# Patient Record
Sex: FEMALE | Race: BLACK OR AFRICAN AMERICAN | NOT HISPANIC OR LATINO | Employment: PART TIME | URBAN - METROPOLITAN AREA
[De-identification: names, ages, dates, MRNs, and addresses within clinical notes are randomized per-mention and may not be internally consistent; named-entity substitution may affect disease eponyms.]

---

## 2019-07-17 ENCOUNTER — HISTORICAL (OUTPATIENT)
Dept: LAB | Facility: HOSPITAL | Age: 64
End: 2019-07-17

## 2019-07-17 LAB
ABS NEUT (OLG): 3.97 X10(3)/MCL (ref 2.1–9.2)
ALBUMIN SERPL-MCNC: 3.7 GM/DL (ref 3.4–5)
ALBUMIN/GLOB SERPL: 0.9 RATIO (ref 1–2)
ALP SERPL-CCNC: 66 UNIT/L (ref 45–117)
ALT SERPL-CCNC: 18 UNIT/L (ref 13–56)
APPEARANCE, UA: CLEAR
AST SERPL-CCNC: 21 UNIT/L (ref 15–37)
BACTERIA SPEC CULT: NORMAL
BASOPHILS # BLD AUTO: 0.03 X10(3)/MCL (ref 0–0.2)
BASOPHILS NFR BLD AUTO: 0.4 % (ref 0–1)
BILIRUB SERPL-MCNC: 0.4 MG/DL (ref 0.2–1)
BILIRUB UR QL STRIP: NEGATIVE
BILIRUBIN DIRECT+TOT PNL SERPL-MCNC: <0.1 MG/DL (ref 0–0.2)
BILIRUBIN DIRECT+TOT PNL SERPL-MCNC: >0.3 MG/DL (ref 0–1)
BUN SERPL-MCNC: 12 MG/DL (ref 7–18)
CALCIUM SERPL-MCNC: 9.2 MG/DL (ref 8.5–10.1)
CHLORIDE SERPL-SCNC: 107 MMOL/L (ref 98–107)
CHOLEST SERPL-MCNC: 249 MG/DL (ref 0–199)
CHOLEST/HDLC SERPL: 3 MG/DL (ref 0–8)
CO2 SERPL-SCNC: 26 MMOL/L (ref 21–32)
COLOR UR: YELLOW
CREAT SERPL-MCNC: 0.68 MG/DL (ref 0.55–1.02)
EOSINOPHIL # BLD AUTO: 0.06 X10(3)/MCL (ref 0–0.9)
EOSINOPHIL NFR BLD AUTO: 0.9 % (ref 0–6.4)
ERYTHROCYTE [DISTWIDTH] IN BLOOD BY AUTOMATED COUNT: 13.4 % (ref 11.5–17)
EST. AVERAGE GLUCOSE BLD GHB EST-MCNC: 163 MG/DL
GLOBULIN SER-MCNC: 4 GM/DL (ref 2–4)
GLUCOSE (UA): NEGATIVE
GLUCOSE SERPL-MCNC: 101 MG/DL (ref 74–106)
HBA1C MFR BLD: 7.3 % (ref 4.2–6.3)
HCT VFR BLD AUTO: 39.6 % (ref 37–47)
HDLC SERPL-MCNC: 72 MG/DL
HGB BLD-MCNC: 13.5 GM/DL (ref 12–16)
HGB UR QL STRIP: NEGATIVE
IMM GRANULOCYTES # BLD AUTO: 0.02 10*3/UL (ref 0–0.02)
IMM GRANULOCYTES NFR BLD AUTO: 0.3 % (ref 0–0.43)
KETONES UR QL STRIP: ABNORMAL
LDLC SERPL CALC-MCNC: 156 MG/DL (ref 0–129)
LEUKOCYTE ESTERASE UR QL STRIP: ABNORMAL
LYMPHOCYTES # BLD AUTO: 2.37 X10(3)/MCL (ref 0.6–4.6)
LYMPHOCYTES NFR BLD AUTO: 33.6 % (ref 16–44)
MCH RBC QN AUTO: 32.4 PG (ref 27–31)
MCHC RBC AUTO-ENTMCNC: 34.1 GM/DL (ref 33–36)
MCV RBC AUTO: 95 FL (ref 80–94)
MONOCYTES # BLD AUTO: 0.6 X10(3)/MCL (ref 0.1–1.3)
MONOCYTES NFR BLD AUTO: 8.5 % (ref 4–12.1)
NEUTROPHILS # BLD AUTO: 3.97 X10(3)/MCL (ref 2.1–9.2)
NEUTROPHILS NFR BLD AUTO: 56.3 % (ref 43–73)
NITRITE UR QL STRIP: NEGATIVE
NRBC BLD AUTO-RTO: 0 % (ref 0–0.2)
PH UR STRIP: 6 [PH] (ref 5–7)
PLATELET # BLD AUTO: 240 X10(3)/MCL (ref 130–400)
PMV BLD AUTO: 11.5 FL (ref 7.4–10.4)
POTASSIUM SERPL-SCNC: 3.7 MMOL/L (ref 3.5–5.1)
PROT SERPL-MCNC: 7.7 GM/DL (ref 6.4–8.2)
PROT UR QL STRIP: NEGATIVE
RBC # BLD AUTO: 4.17 X10(6)/MCL (ref 4.2–5.4)
RBC #/AREA URNS HPF: 0 /[HPF]
SODIUM SERPL-SCNC: 140 MMOL/L (ref 136–145)
SP GR UR STRIP: <=1.005 (ref 1–1.03)
SQUAMOUS EPITHELIAL, UA: NORMAL /LPF
TRIGL SERPL-MCNC: 107 MG/DL (ref 0–149)
TSH SERPL-ACNC: 3.03 MIU/ML (ref 0.36–3.74)
UROBILINOGEN UR STRIP-ACNC: NEGATIVE
VLDLC SERPL CALC-MCNC: 21 MG/DL
WBC # SPEC AUTO: 7 X10(3)/MCL (ref 4.5–11.5)
WBC #/AREA URNS HPF: NORMAL /HPF

## 2019-07-19 LAB — FINAL CULTURE: NORMAL

## 2020-10-08 ENCOUNTER — HISTORICAL (OUTPATIENT)
Dept: LAB | Facility: HOSPITAL | Age: 65
End: 2020-10-08

## 2020-10-08 LAB
ABS NEUT (OLG): 4.24 X10(3)/MCL (ref 2.1–9.2)
ALBUMIN SERPL-MCNC: 3.7 GM/DL (ref 3.4–4.8)
ALBUMIN/GLOB SERPL: 0.9 RATIO (ref 1.1–2)
ALP SERPL-CCNC: 72 UNIT/L (ref 40–150)
ALT SERPL-CCNC: 12 UNIT/L (ref 0–55)
APPEARANCE, UA: CLEAR
AST SERPL-CCNC: 22 UNIT/L (ref 5–34)
BACTERIA SPEC CULT: NORMAL
BASOPHILS # BLD AUTO: 0.03 X10(3)/MCL (ref 0–0.2)
BASOPHILS NFR BLD AUTO: 0.4 % (ref 0–1)
BILIRUB SERPL-MCNC: 0.4 MG/DL (ref 0.2–1.2)
BILIRUB UR QL STRIP: NEGATIVE
BILIRUBIN DIRECT+TOT PNL SERPL-MCNC: 0.1 MG/DL (ref 0–0.5)
BILIRUBIN DIRECT+TOT PNL SERPL-MCNC: 0.3 MG/DL (ref 0–0.8)
BUN SERPL-MCNC: 10.2 MG/DL (ref 9.8–20.1)
CALCIUM SERPL-MCNC: 9.4 MG/DL (ref 8.4–10.2)
CHLORIDE SERPL-SCNC: 104 MMOL/L (ref 98–107)
CHOLEST SERPL-MCNC: 406 MG/DL
CHOLEST/HDLC SERPL: 5 {RATIO} (ref 0–5)
CO2 SERPL-SCNC: 26 MMOL/L (ref 23–31)
COLOR UR: ABNORMAL
CREAT SERPL-MCNC: 0.7 MG/DL (ref 0.57–1.11)
CREAT UR-MCNC: 21.2 MG/DL (ref 45–106)
EOSINOPHIL # BLD AUTO: 0.05 X10(3)/MCL (ref 0–0.9)
EOSINOPHIL NFR BLD AUTO: 0.7 % (ref 0–6.4)
ERYTHROCYTE [DISTWIDTH] IN BLOOD BY AUTOMATED COUNT: 14.2 % (ref 11.5–17)
EST. AVERAGE GLUCOSE BLD GHB EST-MCNC: 159.9 MG/DL
GLOBULIN SER-MCNC: 3.9 GM/DL (ref 2.4–3.5)
GLUCOSE (UA): NEGATIVE
GLUCOSE SERPL-MCNC: 133 MG/DL (ref 82–115)
HBA1C MFR BLD: 7.2 %
HCT VFR BLD AUTO: 38.9 % (ref 37–47)
HDLC SERPL-MCNC: 74 MG/DL (ref 40–60)
HGB BLD-MCNC: 12.9 GM/DL (ref 12–16)
HGB UR QL STRIP: NEGATIVE
IMM GRANULOCYTES # BLD AUTO: 0.02 10*3/UL (ref 0–0.02)
IMM GRANULOCYTES NFR BLD AUTO: 0.3 % (ref 0–0.43)
KETONES UR QL STRIP: NEGATIVE
LDLC SERPL CALC-MCNC: 295 MG/DL (ref 50–140)
LEUKOCYTE ESTERASE UR QL STRIP: ABNORMAL
LYMPHOCYTES # BLD AUTO: 2.24 X10(3)/MCL (ref 0.6–4.6)
LYMPHOCYTES NFR BLD AUTO: 31.5 % (ref 16–44)
MCH RBC QN AUTO: 31.6 PG (ref 27–31)
MCHC RBC AUTO-ENTMCNC: 33.2 GM/DL (ref 33–36)
MCV RBC AUTO: 95.3 FL (ref 80–94)
MICROALBUMIN UR-MCNC: <5 UG/ML
MICROALBUMIN/CREAT RATIO PNL UR: <23.6 MG/GM CR (ref 0–30)
MONOCYTES # BLD AUTO: 0.52 X10(3)/MCL (ref 0.1–1.3)
MONOCYTES NFR BLD AUTO: 7.3 % (ref 4–12.1)
NEUTROPHILS # BLD AUTO: 4.24 X10(3)/MCL (ref 2.1–9.2)
NEUTROPHILS NFR BLD AUTO: 59.8 % (ref 43–73)
NITRITE UR QL STRIP: NEGATIVE
NRBC BLD AUTO-RTO: 0 % (ref 0–0.2)
PH UR STRIP: 6.5 [PH] (ref 5–7)
PLATELET # BLD AUTO: 185 X10(3)/MCL (ref 130–400)
PMV BLD AUTO: 12.6 FL (ref 7.4–10.4)
POTASSIUM SERPL-SCNC: 4.1 MMOL/L (ref 3.5–5.1)
PROT SERPL-MCNC: 7.6 GM/DL (ref 5.8–7.6)
PROT UR QL STRIP: NEGATIVE
RBC # BLD AUTO: 4.08 X10(6)/MCL (ref 4.2–5.4)
RBC #/AREA URNS HPF: 0 /[HPF]
SODIUM SERPL-SCNC: 142 MMOL/L (ref 136–145)
SP GR UR STRIP: <=1.005 (ref 1–1.03)
SQUAMOUS EPITHELIAL, UA: NORMAL /LPF
TRIGL SERPL-MCNC: 185 MG/DL (ref 0–150)
TSH SERPL-ACNC: 3 UIU/ML (ref 0.35–4.94)
UROBILINOGEN UR STRIP-ACNC: NEGATIVE
VLDLC SERPL CALC-MCNC: 37 MG/DL
WBC # SPEC AUTO: 7.1 X10(3)/MCL (ref 4.5–11.5)
WBC #/AREA URNS HPF: NORMAL /HPF

## 2020-10-10 LAB — FINAL CULTURE: NORMAL

## 2021-04-09 ENCOUNTER — NURSE TRIAGE (OUTPATIENT)
Dept: ADMINISTRATIVE | Facility: CLINIC | Age: 66
End: 2021-04-09

## 2021-04-09 ENCOUNTER — HISTORICAL (OUTPATIENT)
Dept: LAB | Facility: HOSPITAL | Age: 66
End: 2021-04-09

## 2021-04-09 LAB
CHOLEST SERPL-MCNC: 434 MG/DL
CHOLEST/HDLC SERPL: 6 {RATIO} (ref 0–5)
HDLC SERPL-MCNC: 68 MG/DL (ref 40–60)
LDLC SERPL CALC-MCNC: 332 MG/DL (ref 50–140)
TRIGL SERPL-MCNC: 170 MG/DL (ref 0–150)
VLDLC SERPL CALC-MCNC: 34 MG/DL

## 2021-07-29 ENCOUNTER — HISTORICAL (OUTPATIENT)
Dept: ADMINISTRATIVE | Facility: HOSPITAL | Age: 66
End: 2021-07-29

## 2021-07-30 ENCOUNTER — NURSE TRIAGE (OUTPATIENT)
Dept: ADMINISTRATIVE | Facility: CLINIC | Age: 66
End: 2021-07-30

## 2021-08-12 ENCOUNTER — HISTORICAL (OUTPATIENT)
Dept: ADMINISTRATIVE | Facility: HOSPITAL | Age: 66
End: 2021-08-12

## 2021-09-09 ENCOUNTER — HISTORICAL (OUTPATIENT)
Dept: ADMINISTRATIVE | Facility: HOSPITAL | Age: 66
End: 2021-09-09

## 2022-04-10 ENCOUNTER — HISTORICAL (OUTPATIENT)
Dept: ADMINISTRATIVE | Facility: HOSPITAL | Age: 67
End: 2022-04-10

## 2022-04-29 VITALS
OXYGEN SATURATION: 84 % | BODY MASS INDEX: 22.63 KG/M2 | WEIGHT: 123 LBS | SYSTOLIC BLOOD PRESSURE: 136 MMHG | HEIGHT: 62 IN | DIASTOLIC BLOOD PRESSURE: 66 MMHG

## 2022-05-04 NOTE — HISTORICAL [facility]
This is a historical note converted from Zoya. Formatting and pictures may have been removed.  Please reference Zoya for original formatting and attached multimedia. Chief Complaint  6 WK F/U NONOP PELVIC RING, ACETAB. FX. LEFT CLAVICLE FX. IS AMBULATING WITH A CANE. NO COMPLAINTS.  History of Present Illness  66-year-old female returns the office today for continued follow-up for her left?pubic rami fractures, left acetabular anterior wall fracture and left distal clavicle fracture.?The patient states she was discharged from rehab approximately 11 days ago and has been residing at home.?At her last visit in the office she was still nonweightbearing to her left upper extremity and left lower extremity.?She states the rehabilitation facility had her start utilizing a cane prior to her discharge?and therefore she ambulates into the office today.?She apologizes to me and states she did not want to do?the wrong thing.?Patient denies pain to the left upper extremity and left lower extremity.  Review of Systems  14 point review of systems completed and negative unless otherwise stated above  Physical Exam  Vitals & Measurements  T:?36.9? ?C (Oral)? HR:?96(Peripheral)? RR:?20? BP:?136/63?  HT:?157.00?cm? WT:?55.790?kg? BMI:?22.63?  Patient seen and examined.?Ambulates into the office utilizing a cane in her right hand.?Slow to move but no antalgic gait.  Left upper extremity examined.?Active forward flexion of the shoulder to?100 degrees?with abduction to?90 degrees.?No tenderness palpation over the left clavicle.?Patient can flex and extend all digits of the left hand. Capillary refill brisk to all digits. Gross sensation intact to all digits.  Left lower extremity examined. No pain with passive internal/external rotation of the left hip.?Patient can actively flex and extend her knee. Compartments of the leg are soft and compressible.?Patient can dorsiflex plantarflex ankle. Gross sensation intact to the dorsum of the  foot.?Capillary refill brisk to all toes.  Assessment/Plan  Closed fracture of anterior wall of left acetabulum?S32.412A  ?I discussed with the patient that she is doing well.?At some point since her last visit?she?advanced her weightbearing of the left lower extremity.?She states this was directed by the rehabilitation facility.?I reviewed her x-rays of the left clavicle and pelvis today.?There is evidence of bony consolidation throughout the left pubic rami fractures which are being closed treated and also the left anterior acetabular wall fracture.?The patient also has bony consolidation forming throughout the left distal clavicle fracture.?At this point in time I will keep her nonweightbearing to the left upper extremity?but work on active and passive range of motion of the left shoulder.?She will be weight-bear as tolerated to the left lower extremity and work on active and passive range of motion of the left lower extremity.?She has been working with home physical therapy and can now advance to outpatient.?We provided her with a prescription for?outpatient formal physical therapy.?All questions were answered for the patient today.?She is agreeable with her current plan.?I will see her back in the office in approximately 1 month. On her next visit we will obtain x-rays of the left clavicle?in addition to pelvis AP, inlet, outlet and?Judet films of the left hip.  Ordered:  PT/OT External Referral, 08/12/21 8:31:00 CDT, Closed fracture of anterior wall of left acetabulum  Closed fracture of left clavicle  Closed fracture of left inferior pubic ramus, Evaluate and Treat, 3 X Week, NWB, ROM LUE, WBA, ROM LLE  ?  Closed fracture of left clavicle?S42.002A  Ordered:  PT/OT External Referral, 08/12/21 8:31:00 CDT, Closed fracture of anterior wall of left acetabulum  Closed fracture of left clavicle  Closed fracture of left inferior pubic ramus, Evaluate and Treat, 3 X Week, NWB, ROM LUE, WBA, ROM LLE  ?  Closed  fracture of left inferior pubic ramus?S32.592A  Ordered:  PT/OT External Referral, 21 8:31:00 CDT, Closed fracture of anterior wall of left acetabulum  Closed fracture of left clavicle  Closed fracture of left inferior pubic ramus, Evaluate and Treat, 3 X Week, NWB, ROM LUE, WBA, ROM LLE  ?  Referrals  PT/OT External Referral, 21 8:31:00 CDT, Closed fracture of anterior wall of left acetabulum  Closed fracture of left clavicle  Closed fracture of left inferior pubic ramus, Evaluate and Treat, 3 X Week, NWB, ROM LUE, WBA, ROM LLE   Problem List/Past Medical History  Ongoing  DM (diabetes mellitus), type 1(  Confirmed  )  HLD (hyperlipidemia)  Left bundle branch block (LBBB)  Mammogram declined  Osteoporosis screening declined  Pneumococcal vaccination declined  Wellness examination  Historical  Diabetes mellitus  Tetanus, diphtheria, and acellular pertussis (Tdap) vaccination declined  Procedure/Surgical History  HbA1c - Hemoglobin A1c level (2017)  Lipid screening (2017)   section  right wrist surgery   Medications  glucagon 1 mg injection, 0.5 mg, IM, Once  Relion NovoLIN N, 10 units, Subcutaneous, qAM  Allergies  No Known Allergies  No Known Medication Allergies  Social History  Abuse/Neglect  No, No, Yes, 2021  No, No, Yes, 2021  Alcohol  Never, 2021  Employment/School  Retired, 2015  Exercise  Exercise frequency: 1-2 times/week., 2015  Home/Environment  Living situation: Home/Independent., 2015  Nutrition/Health  Diabetic, 2015  Substance Use  Never, 2015  Tobacco  Never (less than 100 in lifetime), No, 2021  Never (less than 100 in lifetime), No, 2021  Family History  Arthritis: Mother.  Father: History is negative  Immunizations  Vaccine Date Status   hepatitis B adult vaccine 10/15/2019 Recorded   hepatitis B adult vaccine 04/15/2019 Recorded   hepatitis B adult vaccine 2019 Recorded    tetanus/diphtheria/pertussis, acel(Tdap) 11/27/2012 Recorded   influenza virus vaccine, inactivated 12/05/2001 Recorded   Health Maintenance  Health Maintenance  ???Pending?(in the next year)  ??? ??OverDue  ??? ? ? ?Cognitive Screening due??01/02/21??and every 1??year(s)  ??? ??Due?  ??? ? ? ?Colorectal Screening due??08/02/21??Unknown Frequency  ??? ? ? ?Coronary Artery Disease Maintenance-Antiplatelet Agent Prescribed due??08/12/21??Unknown Frequency  ??? ? ? ?Coronary Artery Disease Maintenance-Lipid Lowering Therapy due??08/12/21??Unknown Frequency  ??? ? ? ?Medicare Annual Wellness Exam due??08/12/21??and every 1??year(s)  ??? ? ? ?Pneumococcal Vaccine due??08/12/21??Unknown Frequency  ??? ? ? ?Zoster Vaccine due??08/12/21??Unknown Frequency  ??? ??Refused?  ??? ? ? ?Bone Density Screening due??08/12/21??Variable frequency  ??? ??Due In Future?  ??? ? ? ?Diabetes Maintenance-Foot Exam not due until??10/13/21??and every 1??year(s)  ??? ? ? ?Aspirin Therapy for CVD Prevention not due until??10/13/21??and every 1??year(s)  ??? ? ? ?Obesity Screening not due until??01/01/22??and every 1??year(s)  ??? ? ? ?Advance Directive not due until??01/02/22??and every 1??year(s)  ??? ? ? ?Alcohol Misuse Screening not due until??01/02/22??and every 1??year(s)  ??? ? ? ?Fall Risk Assessment not due until??01/02/22??and every 1??year(s)  ??? ? ? ?Functional Assessment not due until??01/02/22??and every 1??year(s)  ??? ? ? ?Diabetes Maintenance-HgbA1c not due until??07/13/22??and every 1??year(s)  ??? ? ? ?Diabetes Maintenance-Fasting Lipid Profile not due until??07/13/22??and every 1??year(s)  ??? ? ? ?Diabetes Maintenance-Serum Creatinine not due until??07/26/22??and every 1??year(s)  ??? ? ? ?ADL Screening not due until??07/30/22??and every 1??year(s)  ???Satisfied?(in the past 1 year)  ??? ??Satisfied?  ??? ? ? ?ADL Screening on??07/30/21.??Satisfied by Nena Carnes  ??? ? ? ?Advance Directive  on??07/30/21.??Satisfied by Nena Carnes.  ??? ? ? ?Alcohol Misuse Screening on??04/13/21.??Satisfied by Katherine Soliz  ??? ? ? ?Aspirin Therapy for CVD Prevention on??10/13/20.??Satisfied by Katherine Soliz  ??? ? ? ?Blood Pressure Screening on??08/12/21.??Satisfied by Antonia Barton.  ??? ? ? ?Body Mass Index Check on??08/12/21.??Satisfied by Antonia Barton.  ??? ? ? ?Cognitive Screening on??10/21/20.??Satisfied by Katherine Soliz  ??? ? ? ?Coronary Artery Disease Maintenance-Lipid Lowering Therapy on??07/03/21.??Satisfied by SEAN Gray Mikesha  ??? ? ? ?Depression Screening on??08/12/21.??Satisfied by Antonia Barton.  ??? ? ? ?Diabetes Maintenance-Serum Creatinine on??07/26/21.??Satisfied by Otoniel Blair  ??? ? ? ?Diabetes Maintenance-Fasting Lipid Profile on??07/13/21.??Satisfied by Brittaney Jauregui  ??? ? ? ?Diabetes Maintenance-HgbA1c on??07/13/21.??Satisfied by Brittaney Jauregui  ??? ? ? ?Diabetes Maintenance-Foot Exam on??10/13/20.??Satisfied by Martha Das MD  ??? ? ? ?Diabetes Maintenance-Microalbumin on??10/08/20.??Satisfied by Joceline Lazcano  ??? ? ? ?Diabetes Screening on??07/26/21.??Satisfied by Otoniel Blair  ??? ? ? ?Fall Risk Assessment on??08/12/21.??Satisfied by Antonia Barton.  ??? ? ? ?Functional Assessment on??07/30/21.??Satisfied by Nena Carnes.  ??? ? ? ?Lipid Screening on??07/13/21.??Satisfied by Brittaney Jauregui  ??? ? ? ?Obesity Screening on??08/12/21.??Satisfied by Antonia Barton.  ??? ??Refused?  ??? ? ? ?Aspirin Therapy for CVD Prevention on??10/13/20.??Recorded by Martha Das MD  ??? ? ? ?Bone Density Screening on??07/30/21.??Recorded by Martha Das MD??Reason: Patient Refuses  ??? ? ? ?Breast Cancer Screening (1 yr) on??10/13/20.??Recorded by Martha Das MD??Reason: Patient Refuses  ??? ? ? ?Diabetes Maintenance-Eye Exam on??07/30/21.??Recorded by Martha Das MD  ??? ? ? ?Pneumococcal Vaccine on??07/30/21.??Recorded  by Nena Carnes??Reason: Patient Refuses  ??? ? ? ?Tetanus Vaccine on??10/13/20.??Recorded by Katherine Soliz  ??? ? ? ?Zoster Vaccine on??07/30/21.??Recorded by Nena Carnes??Reason: Patient Refuses  ?  Diagnostic Results  X-rays of the left clavicle obtained?2 view.?There is mild bony consolidation throughout the distal?third clavicle fracture.  X-rays of the pelvis AP, inlet, outlet and Judet films of the left hip obtained.?Patient has mild bony consolidation throughout the left sided pelvis and acetabulum.?Pelvic ring is circumferential.

## 2022-05-04 NOTE — HISTORICAL [facility]
This is a historical note converted from Zoya. Formatting and pictures may have been removed.  Please reference Zoya for original formatting and attached multimedia. Chief Complaint  10 week f/u nonop pelvic ring, acetabulum fx, left clavicle fx, ambulating without assistance, in therapy, no complaints  History of Present Illness  66-year-old pleasant female returns to the office today for continued follow-up?status post closed treatment of left distal clavicle fracture?and?pelvic ring/left-sided acetabular fracture.? Patient states she is doing very well?and?continues to go to outpatient physical therapy twice weekly.? She has been weight-bear as tolerated to her bilateral lower extremities. ?She has been nonweightbearing to the left upper extremity but working on range of motion.? Patient states she is not having pain in her extremities.? She is ambulating well without assistive device.  Review of Systems  14 point review of systems completed and negative unless otherwise stated above  Physical Exam  Vitals & Measurements  HR:?96(Peripheral)? RR:?20? BP:?136/66?  HT:?157.00?cm? WT:?55.790?kg? BMI:?22.63?  Patient seen and examined.  Left upper extremity examined.? Patient has no tenderness to palpation over the left distal clavicle.? Patient can forward flex the shoulder to 120 degrees and AB duct?the shoulder to 90 degrees.? Compartments of the arm are soft and compressible.? Capillary refill brisk to all digits. ?Gross sensation intact to all digits.  Bilateral lower extremities examined. ?Patient can actively flex and extend her knees. ?She can perform straight leg raises.? No pain elicited with passive internal/external rotation of the hips.? Patient can dorsiflex plantarflex ankles. ?Gross sensation intact to the feet. ?Capillary refill brisk to all toes.? Patient ambulates without antalgic gait.  Assessment/Plan  1.?Closed fracture of anterior wall of left acetabulum?S32.259D  ?I discussed with the  patient that she is doing very well.? She can continue going to outpatient physical therapy and she has been going twice per week.? I will keep her weight-bear as tolerated to bilateral lower extremities?and will add up to 5 pounds?of weight and/or resistance to for the left upper extremity.? I reviewed x-ray imaging with the patient in the office today. I discussed with her that I am moving on from this Orthopedic practice and on her next visit she will be seen by one of the other Orthopedic providers.? She is agreeable with this plan. ?All questions were answered for her today.  Ordered:  Clinic Follow up, *Est. 10/21/21 3:00:00 CDT, Order for future visit, Closed fracture of anterior wall of left acetabulum  Closed fracture of left inferior pubic ramus, [facility]  Post-Op follow-up visit [id number] [facility], Closed fracture of anterior wall of left acetabulum  Closed fracture of left inferior pubic ramus, [facility], 09/09/21 9:20:00 CDT  XR Pelvis Minimum 3 Views, Routine, 09/09/21 8:17:00 CDT, None, Ambulatory, Rad Type, Closed fracture of anterior wall of left acetabulum  Closed fracture of left inferior pubic ramus, Not Scheduled, 09/09/21 8:17:00 CDT  ?  2.?Closed fracture of left inferior pubic ramus?S32.592D  Ordered:  Clinic Follow up, *Est. 10/21/21 3:00:00 CDT, Order for future visit, Closed fracture of anterior wall of left acetabulum  Closed fracture of left inferior pubic ramus, [facility]  Post-Op follow-up visit [id number] PC, Closed fracture of anterior wall of left acetabulum  Closed fracture of left inferior pubic ramus, [facility], 09/09/21 9:20:00 CDT  XR Pelvis Minimum 3 Views, Routine, 09/09/21 8:17:00 CDT, None, Ambulatory, Rad Type, Closed fracture of anterior wall of left acetabulum  Closed fracture of left inferior pubic ramus, Not Scheduled, 09/09/21 8:17:00 CDT  ?  Referrals  Clinic Follow up, *Est. 10/21/21 3:00:00 CDT, Order for future visit, Closed fracture of  anterior wall of left acetabulum  Closed fracture of left inferior pubic ramus, [facility]   Problem List/Past Medical History  Ongoing  Closed fracture of anterior wall of left acetabulum  Closed fracture of left inferior pubic ramus  DM (diabetes mellitus), type 1(  Confirmed  )  HLD (hyperlipidemia)  Left bundle branch block (LBBB)  Mammogram declined  Osteoporosis screening declined  Pneumococcal vaccination declined  Wellness examination  Historical  Diabetes mellitus  Tetanus, diphtheria, and acellular pertussis (Tdap) vaccination declined  Procedure/Surgical History  HbA1c - Hemoglobin A1c level (2017)  Lipid screening (2017)   section  right wrist surgery   Medications  glucagon 1 mg injection, 0.5 mg, IM, Once  Relion NovoLIN N, 10 units, Subcutaneous, qAM  Allergies  No Known Allergies  No Known Medication Allergies  Social History  Abuse/Neglect  No, No, Yes, 2021  No, No, Yes, 2021  Alcohol  Never, 2021  Employment/School  Retired, 2015  Exercise  Exercise frequency: 1-2 times/week., 2015  Home/Environment  Living situation: Home/Independent., 2015  Nutrition/Health  Diabetic, 2015  Substance Use  Never, 2015  Tobacco  Never (less than 100 in lifetime), No, 2021  Never (less than 100 in lifetime), No, 2021  Family History  Arthritis: Mother.  Father: History is negative  Immunizations  Vaccine Date Status   hepatitis B adult vaccine 10/15/2019 Recorded   hepatitis B adult vaccine 04/15/2019 Recorded   hepatitis B adult vaccine 2019 Recorded   tetanus/diphtheria/pertussis, acel(Tdap) 2012 Recorded   influenza virus vaccine, inactivated 2001 Recorded   Health Maintenance  Health Maintenance  ???Pending?(in the next year)  ??? ??OverDue  ??? ? ? ?Cognitive Screening due??21??and every 1??year(s)  ??? ??Due?  ??? ? ? ?Colorectal Screening due??21??Unknown Frequency  ??? ? ? ?Coronary Artery  Disease Maintenance-Antiplatelet Agent Prescribed due??09/09/21??Unknown Frequency  ??? ? ? ?Coronary Artery Disease Maintenance-Lipid Lowering Therapy due??09/09/21??Unknown Frequency  ??? ? ? ?Medicare Annual Wellness Exam due??09/09/21??and every 1??year(s)  ??? ? ? ?Pneumococcal Vaccine due??09/09/21??Unknown Frequency  ??? ? ? ?Zoster Vaccine due??09/09/21??Unknown Frequency  ??? ??Refused?  ??? ? ? ?Bone Density Screening due??09/09/21??Variable frequency  ??? ??Due In Future?  ??? ? ? ?Diabetes Maintenance-Foot Exam not due until??10/13/21??and every 1??year(s)  ??? ? ? ?Aspirin Therapy for CVD Prevention not due until??10/13/21??and every 1??year(s)  ??? ? ? ?Obesity Screening not due until??01/01/22??and every 1??year(s)  ??? ? ? ?Advance Directive not due until??01/02/22??and every 1??year(s)  ??? ? ? ?Alcohol Misuse Screening not due until??01/02/22??and every 1??year(s)  ??? ? ? ?Fall Risk Assessment not due until??01/02/22??and every 1??year(s)  ??? ? ? ?Functional Assessment not due until??01/02/22??and every 1??year(s)  ??? ? ? ?Diabetes Maintenance-HgbA1c not due until??07/13/22??and every 1??year(s)  ??? ? ? ?Diabetes Maintenance-Fasting Lipid Profile not due until??07/13/22??and every 1??year(s)  ??? ? ? ?Diabetes Maintenance-Serum Creatinine not due until??07/26/22??and every 1??year(s)  ??? ? ? ?ADL Screening not due until??07/30/22??and every 1??year(s)  ???Satisfied?(in the past 1 year)  ??? ??Satisfied?  ??? ? ? ?ADL Screening on??07/30/21.??Satisfied by Nena Carnes.  ??? ? ? ?Advance Directive on??07/30/21.??Satisfied by Nena Carnes.  ??? ? ? ?Alcohol Misuse Screening on??04/13/21.??Satisfied by Katherine Soliz  ??? ? ? ?Aspirin Therapy for CVD Prevention on??10/13/20.??Satisfied by Katherine Soliz  ??? ? ? ?Blood Pressure Screening on??09/09/21.??Satisfied by Antonia Barton  ??? ? ? ?Body Mass Index Check on??09/09/21.??Satisfied by Antonia Barton  ??? ? ? ?Cognitive  Screening on??10/21/20.??Satisfied by Katherine Soliz  ??? ? ? ?Coronary Artery Disease Maintenance-Lipid Lowering Therapy on??07/03/21.??Satisfied by SEAN Gray Mikesha  ??? ? ? ?Depression Screening on??09/09/21.??Satisfied by Antonia Barton.  ??? ? ? ?Diabetes Maintenance-Serum Creatinine on??07/26/21.??Satisfied by Otoniel Blair  ??? ? ? ?Diabetes Maintenance-Fasting Lipid Profile on??07/13/21.??Satisfied by Brittaney Jauregui  ??? ? ? ?Diabetes Maintenance-HgbA1c on??07/13/21.??Satisfied by Brittaney Jauregui  ??? ? ? ?Diabetes Maintenance-Foot Exam on??10/13/20.??Satisfied by Martha Das MD  ??? ? ? ?Diabetes Maintenance-Microalbumin on??10/08/20.??Satisfied by Joceline Lazcano  ??? ? ? ?Diabetes Screening on??07/26/21.??Satisfied by Otoniel Blair  ??? ? ? ?Fall Risk Assessment on??09/09/21.??Satisfied by Antonia Barton.  ??? ? ? ?Functional Assessment on??07/30/21.??Satisfied by Nena Carnes.  ??? ? ? ?Lipid Screening on??07/13/21.??Satisfied by Brittaney Jauregui  ??? ? ? ?Obesity Screening on??09/09/21.??Satisfied by Antonia Barton.  ??? ??Refused?  ??? ? ? ?Aspirin Therapy for CVD Prevention on??10/13/20.??Recorded by Martha Das MD  ??? ? ? ?Bone Density Screening on??07/30/21.??Recorded by Martha Das MD??Reason: Patient Refuses  ??? ? ? ?Breast Cancer Screening (1 yr) on??10/13/20.??Recorded by Martha Das MD??Reason: Patient Refuses  ??? ? ? ?Diabetes Maintenance-Eye Exam on??07/30/21.??Recorded by Martha Das MD  ??? ? ? ?Pneumococcal Vaccine on??07/30/21.??Recorded by Nena Carnes??Reason: Patient Refuses  ??? ? ? ?Tetanus Vaccine on??10/13/20.??Recorded by Katherine Soliz  ??? ? ? ?Zoster Vaccine on??07/30/21.??Recorded by Nena Carnes??Reason: Patient Refuses  ?  Diagnostic Results  X-rays of the left clavicle were obtained.? Distal clavicle fracture which is minimally displaced with evidence of continued bony consolidation.  X-rays  of?pelvis?and Judet films obtained.? Patient has abundant bony consolidation throughout?pelvic ring fractures and left-sided acetabulum.? Patient has?degenerative changes noted throughout bilateral femoral acetabular joints.

## 2022-05-04 NOTE — HISTORICAL [facility]
This is a historical note converted from Zoya. Formatting and pictures may have been removed.  Please reference Zoya for original formatting and attached multimedia. Chief Complaint  1 month Pelvis and clavicle fracture 6/30/21. Patient reports in wheelchair today. States doing therapy almost every day. States able to stand up, but denies walking at this time.  History of Present Illness  66-year-old female returns the office today transferred from?rehabilitation facility.? She has been nonweightbearing to her left lower extremity. ?She has been nonweightbearing to the left upper extremity and wearing a sling.? She is hoping to have advancement in weightbearing because she is eager to go home.? I saw her in consultation at [facility] on 6/30/21 with regards to closed treatment of left inferior pubic ramus fracture,?left anterior acetabular wall fracture, and left distal clavicle fracture.  Review of Systems  14 point review of systems completed and negative unless otherwise stated above  Physical Exam  Vitals & Measurements  T:?97.7? ?F (Oral)? HR:?85(Peripheral)? BP:?116/64?  HT:?157.00?cm? WT:?55.790?kg? BMI:?22.63?  Left lower extremity was examined.? Patient is able to actively flex and extend her knee. ?Gross sensation is intact to the dorsum of the foot.? Patient can actively dorsiflex and plantarflex the foot.? There is no pain elicited with passive internal and external rotation of the left hip.? Capillary refill is brisk to all toes.  The upper extremity was examined. ?There is a sling intact to the?left upper extremity.? Capillary refill is brisk to all digits. ?Patient can flex and extend digits.? There is no tenderness palpation at the distal clavicle.? There is no swelling to the left upper extremity.  Assessment/Plan  Closed fracture of anterior wall of left acetabulum?S32.412A  ?I discussed with the patient that I would now allow her to do range of motion activities for the left upper  extremity?but she is still nonweightbearing and no pushing pulling activities for the left arm.? We discussed?the nature of her fracture to the distal clavicle?and continued close treatment of this fracture at this point in time.? I will also advance her to stand transfers for the left lower extremity.? She can have range of motion as tolerated to the left lower extremity.? Patient will follow up in this office in approximately 2 weeks. ?I will obtain x-rays of the left clavicle and the?pelvis?on her next visit.? I hope to continue to advance her weightbearing for the left lower extremity.? She is agreeable with this plan. ?All questions were answered for her today.  Ordered:  Clinic Follow up, *Est. 08/12/21 3:00:00 CDT, Order for future visit, Closed fracture of left clavicle  Closed fracture of anterior wall of left acetabulum  Closed fracture of left inferior pubic ramus, Orthopaedics  Post-Op follow-up visit [id number] PC, Closed fracture of left clavicle  Closed fracture of left inferior pubic ramus  Closed fracture of anterior wall of left acetabulum, Orthopaedics Clinic, 07/29/21 9:00:00 CDT  ?  Closed fracture of left clavicle?S42.002A  Ordered:  Clinic Follow up, *Est. 08/12/21 3:00:00 CDT, Order for future visit, Closed fracture of left clavicle  Closed fracture of anterior wall of left acetabulum  Closed fracture of left inferior pubic ramus, Orthopaedics  Post-Op follow-up visit [id number] PC, Closed fracture of left clavicle  Closed fracture of left inferior pubic ramus  Closed fracture of anterior wall of left acetabulum, Orthopaedics Clinic, 07/29/21 9:00:00 CDT  XR Clavicle Left, Routine, 07/29/21 8:31:00 CDT, None, Ambulatory, Rad Type, Closed fracture of left clavicle, Not Scheduled, 07/29/21 8:31:00 CDT  ?  Closed fracture of left inferior pubic ramus?S32.592A  Ordered:  Clinic Follow up, *Est. 08/12/21 3:00:00 CDT, Order for future visit, Closed fracture of left clavicle  Closed fracture  of anterior wall of left acetabulum  Closed fracture of left inferior pubic ramus, [facility]  Post-Op follow-up visit [id number] PC, Closed fracture of left clavicle  Closed fracture of left inferior pubic ramus  Closed fracture of anterior wall of left acetabulum, [facility], 21 9:00:00 CDT  XR Pelvis Minimum 3 Views, Routine, 21 8:26:00 CDT, None, Ambulatory, Rad Type, Closed fracture of left inferior pubic ramus, Not Scheduled, 21 8:26:00 CDT  ?  Referrals  Clinic Follow up, *Est. 21 3:00:00 CDT, Order for future visit, Closed fracture of left clavicle  Closed fracture of anterior wall of left acetabulum  Closed fracture of left inferior pubic ramus, [facility]   Problem List/Past Medical History  Ongoing  DM (diabetes mellitus), type 1(  Confirmed  )  HLD (hyperlipidemia)  Mammogram declined  Osteoporosis screening declined  Tetanus, diphtheria, and acellular pertussis (Tdap) vaccination declined  Wellness examination  Historical  Diabetes mellitus  Procedure/Surgical History  HbA1c - Hemoglobin A1c level (2017)  Lipid screening (2017)   section  right wrist surgery   Medications  insulin glargine 100 units/mL subcutaneous solution, 8 units, Subcutaneous, BID,? ?Not taking  Relion NovoLIN N, 10 units, Subcutaneous, qAM  Allergies  No Known Medication Allergies  Social History  Abuse/Neglect  No, No, Yes, 2021  Alcohol  Never, 2021  Employment/School  Retired, 2015  Exercise  Exercise frequency: 1-2 times/week., 2015  Home/Environment  Living situation: Home/Independent., 2015  Nutrition/Health  Diabetic, 2015  Substance Use  Never, 2015  Tobacco  Never (less than 100 in lifetime), No, 2021  Family History  Arthritis: Mother.  Father: History is negative  Immunizations  Vaccine Date Status   hepatitis B adult vaccine 10/15/2019 Recorded   hepatitis B adult vaccine 04/15/2019 Recorded   hepatitis B  adult vaccine 03/13/2019 Recorded   tetanus/diphtheria/pertussis, acel(Tdap) 11/27/2012 Recorded   influenza virus vaccine, inactivated 12/05/2001 Recorded   Health Maintenance  Health Maintenance  ???Pending?(in the next year)  ??? ??OverDue  ??? ? ? ?Influenza Vaccine due??10/01/20??and every 1??day(s)  ??? ? ? ?Cognitive Screening due??01/02/21??and every 1??year(s)  ??? ??Due?  ??? ? ? ?ADL Screening due??07/29/21??and every 1??year(s)  ??? ? ? ?Coronary Artery Disease Maintenance-Antiplatelet Agent Prescribed due??07/29/21??Unknown Frequency  ??? ? ? ?Coronary Artery Disease Maintenance-Lipid Lowering Therapy due??07/29/21??Unknown Frequency  ??? ? ? ?Diabetes Maintenance-Eye Exam due??07/29/21??Unknown Frequency  ??? ? ? ?Medicare Annual Wellness Exam due??07/29/21??and every 1??year(s)  ??? ? ? ?Pneumococcal Vaccine due??07/29/21??Unknown Frequency  ??? ? ? ?Zoster Vaccine due??07/29/21??Unknown Frequency  ??? ??Refused?  ??? ? ? ?Bone Density Screening due??07/29/21??Variable frequency  ??? ??Due In Future?  ??? ? ? ?Colorectal Screening not due until??08/02/21??Unknown Frequency?  ??? ? ? ?Diabetes Maintenance-Foot Exam not due until??10/13/21??and every 1??year(s)  ??? ? ? ?Aspirin Therapy for CVD Prevention not due until??10/13/21??and every 1??year(s)  ??? ? ? ?Obesity Screening not due until??01/01/22??and every 1??year(s)  ??? ? ? ?Advance Directive not due until??01/02/22??and every 1??year(s)  ??? ? ? ?Alcohol Misuse Screening not due until??01/02/22??and every 1??year(s)  ??? ? ? ?Fall Risk Assessment not due until??01/02/22??and every 1??year(s)  ??? ? ? ?Functional Assessment not due until??01/02/22??and every 1??year(s)  ??? ? ? ?Diabetes Maintenance-HgbA1c not due until??07/13/22??and every 1??year(s)  ??? ? ? ?Diabetes Maintenance-Fasting Lipid Profile not due until??07/13/22??and every 1??year(s)  ??? ? ? ?Diabetes Maintenance-Serum Creatinine not due until??07/26/22??and every  1??year(s)  ???Satisfied?(in the past 1 year)  ??? ??Satisfied?  ??? ? ? ?Advance Directive on??06/30/21.??Satisfied by SEAN Magallanes, Donna  ??? ? ? ?Alcohol Misuse Screening on??04/13/21.??Satisfied by Katherine Soliz  ??? ? ? ?Aspirin Therapy for CVD Prevention on??10/13/20.??Satisfied by Katherine Soliz  ??? ? ? ?Blood Pressure Screening on??07/29/21.??Satisfied by Ruba Landers  ??? ? ? ?Body Mass Index Check on??07/29/21.??Satisfied by Ruba Landers  ??? ? ? ?Cognitive Screening on??10/21/20.??Satisfied by Katherine Soliz  ??? ? ? ?Coronary Artery Disease Maintenance-Lipid Lowering Therapy on??07/03/21.??Satisfied by SEAN Gray, Regina  ??? ? ? ?Depression Screening on??07/29/21.??Satisfied by Ruba Landers  ??? ? ? ?Diabetes Maintenance-Serum Creatinine on??07/26/21.??Satisfied by Otoniel Blair  ??? ? ? ?Diabetes Maintenance-Fasting Lipid Profile on??07/13/21.??Satisfied by Brittaney Jauregui  ??? ? ? ?Diabetes Maintenance-HgbA1c on??07/13/21.??Satisfied by Brittaney Jauregui  ??? ? ? ?Diabetes Maintenance-Foot Exam on??10/13/20.??Satisfied by Martha Das MD  ??? ? ? ?Diabetes Maintenance-Microalbumin on??10/08/20.??Satisfied by Joceline Lazcano  ??? ? ? ?Diabetes Screening on??07/26/21.??Satisfied by Otoniel Blair  ??? ? ? ?Fall Risk Assessment on??07/29/21.??Satisfied by Ruba Landers  ??? ? ? ?Functional Assessment on??07/09/21.??Satisfied by Lanny Nowak OT  ??? ? ? ?Lipid Screening on??07/13/21.??Satisfied by Brittaney Jauregui  ??? ? ? ?Obesity Screening on??07/29/21.??Satisfied by Ruba Landers.  ??? ??Refused?  ??? ? ? ?Aspirin Therapy for CVD Prevention on??10/13/20.??Recorded by Martha Das MD  ??? ? ? ?Bone Density Screening on??04/13/21.??Recorded by Martha Das MD??Reason: Patient Refuses  ??? ? ? ?Breast Cancer Screening (1 yr) on??10/13/20.??Recorded by Martha Das MD??Reason: Patient Refuses  ??? ? ? ?Pneumococcal Vaccine on??04/13/21.??Recorded  by Katherine Soliz  ??? ? ? ?Tetanus Vaccine on??10/13/20.??Recorded by Katherine Soliz  ??? ? ? ?Zoster Vaccine on??04/13/21.??Recorded by Katherine Soliz  ?  Diagnostic Results  X-rays of the left clavicle were obtained.? Distal clavicle fracture identified which is minimally displaced with scant evidence of bony consolidation.  X-rays of the pelvis AP, inlet, and outlet were obtained.? Pelvic ring fractures with left anterior acetabular wall fracture. ?There is?scant evidence of bony consolidation.

## 2023-09-06 PROBLEM — E10.9 TYPE 1 DIABETES MELLITUS: Status: ACTIVE | Noted: 2023-09-06

## 2023-09-06 PROBLEM — I44.7 LEFT BUNDLE BRANCH BLOCK (LBBB): Status: ACTIVE | Noted: 2021-01-01

## 2023-09-06 PROBLEM — S32.599A FRACTURE OF INFERIOR PUBIC RAMUS: Status: ACTIVE | Noted: 2023-09-06

## 2023-09-06 PROBLEM — S22.080S: Status: ACTIVE | Noted: 2021-07-12

## 2023-09-06 PROBLEM — S32.413A: Status: ACTIVE | Noted: 2023-09-06

## 2023-09-06 PROBLEM — E78.2 MIXED HYPERLIPIDEMIA: Status: ACTIVE | Noted: 2021-01-01

## 2023-10-25 ENCOUNTER — TELEPHONE (OUTPATIENT)
Dept: FAMILY MEDICINE | Facility: CLINIC | Age: 68
End: 2023-10-25
Payer: MEDICARE

## 2023-10-25 NOTE — TELEPHONE ENCOUNTER
Patient has not been seen in 2 years. Would need appt for orders or refills. Patient has appt at the end of November. Please call patient to move appt up if she would like. It can be with ismael if needed.    No

## 2023-10-25 NOTE — TELEPHONE ENCOUNTER
----- Message from Latha Senegal sent at 10/25/2023  3:03 PM CDT -----  .Type:  Needs Medical Advice    Who Called:  pt    Symptoms (please be specific):  no       How long has patient had these symptoms:   no    Pharmacy name and phone #:   [facility] in [place]    Would the patient rather a call back? Yes    Best Call Back Number:  [phone]    Additional Information:  pt wants to order a GLUCAGON

## 2023-10-26 PROBLEM — Z00.00 WELLNESS EXAMINATION: Status: ACTIVE | Noted: 2023-10-26

## 2023-10-26 PROBLEM — Z71.89 ADVANCED CARE PLANNING/COUNSELING DISCUSSION: Status: ACTIVE | Noted: 2023-10-26

## 2023-10-26 PROBLEM — Z00.00 ENCOUNTER FOR ANNUAL WELLNESS EXAM IN MEDICARE PATIENT: Status: ACTIVE | Noted: 2023-10-26

## 2023-10-26 NOTE — PROGRESS NOTES
Patient ID: [id number]     Chief Complaint: Medicare AWV (Wellness )      HPI:     Kelly Dover is a 68 y.o. female here today for a Medicare Wellness. No other complaints today.     Patient presents to the clinic unaccompanied to reestablish care.  Her last office visit was 7/2021.  She is due for a wellness visit and blood work.    She has type 1 diabetes diagnosed at age 11. A1c 7.0 7/2021. Urine micro ordered.  Foot exam today.  In office eye exam today.  She is on novolin 15 units daily.  Cbgs 90s. States no one prescribes.  She buys from [employer] without prescription.   She has glucagon as needed for hypoglycemia.  Needs refill.     She declines mammograms.  She declines DEXA.  Cologuard 08/2019.  Pap 7/2019 was normal and HPV negative    She is not allergic to any medications.  She does not drink alcohol or smoke.  She declines immunizations today.        History reviewed. No pertinent surgical history.    Review of patient's allergies indicates:  Not on File    No outpatient medications have been marked as taking for the 10/27/23 encounter (Office Visit) with Martha Das MD.       Social History     Socioeconomic History    Marital status:    Tobacco Use    Smoking status: Never    Smokeless tobacco: Never        History reviewed. No pertinent family history.     Patient Care Team:  Martha Das MD as PCP - General (Family Medicine)       Subjective:     Review of Systems   Constitutional: Negative.    HENT: Negative.     Eyes: Negative.    Respiratory: Negative.     Cardiovascular: Negative.    Gastrointestinal: Negative.    Genitourinary: Negative.    Musculoskeletal: Negative.    Skin: Negative.    Neurological: Negative.    Endo/Heme/Allergies: Negative.    Psychiatric/Behavioral: Negative.           Patient Reported Health Risk Assessment  What [age] your [age] 65-69  Are you male or female?: Female  During the past four weeks, how much have you been bothered by emotional problems such  as feeling anxious, depressed, irritable, sad, or downhearted and blue?: Not at all  During the past five weeks, has your physical and/or emotional health limited your social activities with family, friends, neighbors, or groups?: Not at all  During the past four weeks, how much bodily pain have you generally had?: No pain  During the past four weeks, was someone available to help if you needed and wanted help?: Yes, as much as I wanted  During the past four weeks, what was the hardest physical activity you could do for at least two minutes?: Light  Can you get to places out of walking distance without help?  (For example, can you travel alone on buses or taxis, or drive your own car?): Yes  Can you go shopping for groceries or clothes without someone's help?: Yes  Can you prepare your own meals?: Yes  Can you do your own housework without help?: Yes  Because of any health problems, do you need the help of another person with your personal care needs such as eating, bathing, dressing, or getting around the house?: No  Can you handle your own money without help?: Yes  During the past four weeks, how would you rate your health in general?: Very good  How have things been going for you during the past four weeks?: Good and bad parts about equal  Are you having difficulties driving your car?: No  Do you always fasten your seat belt when you are in a car?: Yes, usually  How often in the past four weeks have you been bothered by falling or dizzy when standing up?: Never  How often in the past four weeks have you been bothered by sexual problems?: Never  How often in the past four weeks have you been bothered by trouble eating well?: Never  How often in the past four weeks have you been bothered by teeth or denture problems?: Never  How often in the past four weeks have you been bothered with problems using the telephone?: Never  How often in the past four weeks have you been bothered by tiredness or fatigue?: Never  Have  "you fallen two or more times in the past year?: No  Are you afraid of falling?: No  Are you a smoker?: No  During the past four weeks, how many drinks of wine, beer, or other alcoholic beverages did you have?: No alcohol at all  Do you exercise for about 20 minutes three or more days a week?: Yes, most of the time  Have you been given any information to help you with hazards in your house that might hurt you?: No  Have you been given any information to help you with keeping track of your medications?: No  How often do you have trouble taking medicines the way you've been told to take them?: I always take them as prescribed  How confident are you that you can control and manage most of your health problems?: Very confident  What is your race? (Check all that apply.):     Objective:     /82 (BP Location: Right arm)   Pulse 83   Temp 98.2 °F (36.8 °C) (Temporal)   Resp 16   Ht 5' 1" (1.549 m)   Wt 60.3 kg (132 lb 14.4 oz)   SpO2 99%   BMI 25.11 kg/m²     Physical Exam  Vitals and nursing note reviewed.   Constitutional:       Appearance: Normal appearance. She is normal weight.   HENT:      Head: Normocephalic and atraumatic.      Nose: Nose normal.      Mouth/Throat:      Mouth: Mucous membranes are moist.      Pharynx: Oropharynx is clear.   Eyes:      Extraocular Movements: Extraocular movements intact.   Cardiovascular:      Rate and Rhythm: Normal rate and regular rhythm.      Pulses: Normal pulses.           Dorsalis pedis pulses are 2+ on the right side and 2+ on the left side.        Posterior tibial pulses are 2+ on the right side and 2+ on the left side.      Heart sounds: Normal heart sounds.   Pulmonary:      Effort: Pulmonary effort is normal.      Breath sounds: Normal breath sounds.   Musculoskeletal:         General: Normal range of motion.      Cervical back: Normal range of motion.        Feet:    Feet:      Right foot:      Protective Sensation: 8 sites tested.  8 sites " sensed.      Skin integrity: Skin integrity normal.      Left foot:      Protective Sensation: 8 sites tested.  8 sites sensed.      Skin integrity: Skin integrity normal.   Skin:     General: Skin is warm and dry.   Neurological:      General: No focal deficit present.      Mental Status: She is alert and oriented to person, place, and time. Mental status is at baseline.   Psychiatric:         Mood and Affect: Mood normal.                No data to display                  10/27/2023     9:30 AM   Fall Risk Assessment - Outpatient   Mobility Status Ambulatory   Number of falls 0   Identified as fall risk False           Depression Screening  Over the past two weeks, has the patient felt down, depressed, or hopeless?: No  Over the past two weeks, has the patient felt little interest or pleasure in doing things?: No  Functional Ability/Safety Screening  Was the patient's timed Up & Go test unsteady or longer than 30 seconds?: No  Does the patient need help with phone, transportation, shopping, preparing meals, housework, laundry, meds, or managing money?: No  Does the patient's home have rugs in the hallway, lack grab bars in the bathroom, lack handrails on the stairs or have poor lighting?: No  Have you noticed any hearing difficulties?: No  Cognitive Function (Assessed through direct observation with due consideration of information obtained by way of patient reports and/or concerns raised by family, friends, caretakers, or others)    Does the patient repeat questions/statements in the same day?: No  Does the patient have trouble remembering the date, year, and time?: No  Does the patient have difficulty managing finances?: No  Does the patient have a decreased sense of direction?: No  Assessment/Plan:       Medicare Annual Wellness and Personalized Prevention Plan:   Fall Risk + Home Safety + Hearing Impairment + Depression Screen + Cognitive Impairment Screen + Health Risk Assessment all reviewed.     Opioid  Screening: Patient medication list reviewed, patient is not taking prescription opioids. Patient is not using additional opioids than prescribed. Patient is at low risk of substance abuse based on this opioid use history.         Health Maintenance Topics with due status: Not Due       Topic Last Completion Date    Lipid Panel 07/02/2021      The patient's Health Maintenance was reviewed and the following appears to be due at this time:   Health Maintenance Due   Topic Date Due    Hepatitis C Screening  Never done    COVID-19 Vaccine (1) Never done         1. Encounter for annual wellness exam in Medicare patient  Assessment & Plan:  Fasting labs ordered. Will call with results when available  Declines mmg, dexa  Declines immunizations    Advanced care planning discussed and paperwork given.     Orders:  -     CBC Auto Differential; Future; Expected date: 10/27/2023  -     Comprehensive Metabolic Panel; Future; Expected date: 10/27/2023  -     Lipid Panel; Future; Expected date: 10/27/2023  -     TSH; Future; Expected date: 10/27/2023  -     Hemoglobin A1C; Future; Expected date: 10/27/2023  -     Urinalysis; Future; Expected date: 10/27/2023  -     Microalbumin/Creatinine Ratio, Urine; Future; Expected date: 10/27/2023  -     Hepatitis C Antibody; Future; Expected date: 10/27/2023    2. Advanced care planning/counseling discussion  Assessment & Plan:  Advanced care planning discussed and paperwork given.    I attest that I have had a face to face discussion with patient and or surrogate decision maker.   Included surrogate decision maker: NO  Advanced directive in chart: NO  LAPOST: NO    Total time spent: 16 minutes        3. Type 1 diabetes mellitus with hyperglycemia  Assessment & Plan:  Lab Results   Component Value Date    HGBA1C 7.0 07/05/2021     Urine micro ordered  Foot exam  today  Eye exam in office today    On novolin. Refill sent in. Also send in hypopen if needed for hypoglycemia episodes.      Orders:  -     CBC Auto Differential; Future; Expected date: 10/27/2023  -     Comprehensive Metabolic Panel; Future; Expected date: 10/27/2023  -     Lipid Panel; Future; Expected date: 10/27/2023  -     TSH; Future; Expected date: 10/27/2023  -     Hemoglobin A1C; Future; Expected date: 10/27/2023  -     Urinalysis; Future; Expected date: 10/27/2023  -     Microalbumin/Creatinine Ratio, Urine; Future; Expected date: 10/27/2023  -     Hepatitis C Antibody; Future; Expected date: 10/27/2023  -     insulin regular (RELION NOVOLIN R) 100 unit/mL injection ([facility] ONLY); Inject 15 Units into the skin once daily.  Dispense: 10 mL; Refill: 11  -     glucagon (GVOKE HYPOPEN 2-PACK) 1 mg/0.2 mL AtIn; Inject 1 each into the skin daily as needed (hypoglycemia).  Dispense: 2 each; Refill: 11  -     Diabetic Eye Screening Photo; Future    4. Screening mammography declined  Assessment & Plan:  Declines mmg    Orders:  -     CBC Auto Differential; Future; Expected date: 10/27/2023  -     Comprehensive Metabolic Panel; Future; Expected date: 10/27/2023  -     Lipid Panel; Future; Expected date: 10/27/2023  -     TSH; Future; Expected date: 10/27/2023  -     Hemoglobin A1C; Future; Expected date: 10/27/2023  -     Urinalysis; Future; Expected date: 10/27/2023  -     Microalbumin/Creatinine Ratio, Urine; Future; Expected date: 10/27/2023  -     Hepatitis C Antibody; Future; Expected date: 10/27/2023    5. Osteoporosis screening declined  Assessment & Plan:  Declines dexa    Orders:  -     CBC Auto Differential; Future; Expected date: 10/27/2023  -     Comprehensive Metabolic Panel; Future; Expected date: 10/27/2023  -     Lipid Panel; Future; Expected date: 10/27/2023  -     TSH; Future; Expected date: 10/27/2023  -     Hemoglobin A1C; Future; Expected date: 10/27/2023  -     Urinalysis; Future; Expected date: 10/27/2023  -     Microalbumin/Creatinine Ratio, Urine; Future; Expected date: 10/27/2023  -     Hepatitis C Antibody;  Future; Expected date: 10/27/2023    6. Influenza vaccination declined  Assessment & Plan:  Declines immunizations today           Advance Care Planning   I attest to discussing Advance Care Planning with patient and/or family member.  Education was provided including the importance of the Health Care Power of , Advance Directives, and/or [place] documentation.  The patient expressed understanding to the importance of this information and discussion.  Length of ACP conversation in minutes: 16              Follow up in about 6 months (around 4/27/2024) for diabetes with labs. In addition to their scheduled follow up, the patient has also been instructed to follow up on as needed basis.

## 2023-10-27 ENCOUNTER — CLINICAL SUPPORT (OUTPATIENT)
Dept: FAMILY MEDICINE | Facility: CLINIC | Age: 68
End: 2023-10-27
Attending: FAMILY MEDICINE
Payer: MEDICARE

## 2023-10-27 ENCOUNTER — OFFICE VISIT (OUTPATIENT)
Dept: FAMILY MEDICINE | Facility: CLINIC | Age: 68
End: 2023-10-27
Payer: MEDICARE

## 2023-10-27 VITALS
BODY MASS INDEX: 25.09 KG/M2 | WEIGHT: 132.88 LBS | TEMPERATURE: 98 F | HEART RATE: 83 BPM | SYSTOLIC BLOOD PRESSURE: 136 MMHG | RESPIRATION RATE: 16 BRPM | OXYGEN SATURATION: 99 % | DIASTOLIC BLOOD PRESSURE: 82 MMHG | HEIGHT: 61 IN

## 2023-10-27 DIAGNOSIS — Z53.20 SCREENING MAMMOGRAPHY DECLINED: ICD-10-CM

## 2023-10-27 DIAGNOSIS — E10.65 TYPE 1 DIABETES MELLITUS WITH HYPERGLYCEMIA: ICD-10-CM

## 2023-10-27 DIAGNOSIS — E10.3293 MILD NONPROLIFERATIVE DIABETIC RETINOPATHY OF BOTH EYES WITHOUT MACULAR EDEMA ASSOCIATED WITH TYPE 1 DIABETES MELLITUS: Primary | ICD-10-CM

## 2023-10-27 DIAGNOSIS — Z00.00 ENCOUNTER FOR ANNUAL WELLNESS EXAM IN MEDICARE PATIENT: Primary | ICD-10-CM

## 2023-10-27 DIAGNOSIS — Z53.20 OSTEOPOROSIS SCREENING DECLINED: ICD-10-CM

## 2023-10-27 DIAGNOSIS — Z71.89 ADVANCED CARE PLANNING/COUNSELING DISCUSSION: ICD-10-CM

## 2023-10-27 DIAGNOSIS — Z28.21 INFLUENZA VACCINATION DECLINED: ICD-10-CM

## 2023-10-27 PROBLEM — I44.7 LEFT BUNDLE BRANCH BLOCK (LBBB): Status: RESOLVED | Noted: 2021-01-01 | Resolved: 2023-10-27

## 2023-10-27 PROBLEM — E78.2 MIXED HYPERLIPIDEMIA: Status: RESOLVED | Noted: 2021-01-01 | Resolved: 2023-10-27

## 2023-10-27 PROBLEM — E10.9 TYPE 1 DIABETES MELLITUS: Status: RESOLVED | Noted: 2023-09-06 | Resolved: 2023-10-27

## 2023-10-27 PROBLEM — S32.413A: Status: RESOLVED | Noted: 2023-09-06 | Resolved: 2023-10-27

## 2023-10-27 PROBLEM — S22.080S: Status: RESOLVED | Noted: 2021-07-12 | Resolved: 2023-10-27

## 2023-10-27 PROBLEM — S32.599A FRACTURE OF INFERIOR PUBIC RAMUS: Status: RESOLVED | Noted: 2023-09-06 | Resolved: 2023-10-27

## 2023-10-27 PROCEDURE — 3288F PR FALLS RISK ASSESSMENT DOCUMENTED: ICD-10-PCS | Mod: CPTII,,, | Performed by: FAMILY MEDICINE

## 2023-10-27 PROCEDURE — 2024F 7 FLD RTA PHOTO EVC RTNOPTHY: CPT | Mod: CPTII,,, | Performed by: OPHTHALMOLOGY

## 2023-10-27 PROCEDURE — 1159F MED LIST DOCD IN RCRD: CPT | Mod: CPTII,,, | Performed by: FAMILY MEDICINE

## 2023-10-27 PROCEDURE — 92228 IMG RTA DETC/MNTR DS PHY/QHP: CPT | Mod: 26,,, | Performed by: OPHTHALMOLOGY

## 2023-10-27 PROCEDURE — 1101F PT FALLS ASSESS-DOCD LE1/YR: CPT | Mod: CPTII,,, | Performed by: FAMILY MEDICINE

## 2023-10-27 PROCEDURE — 3079F DIAST BP 80-89 MM HG: CPT | Mod: CPTII,,, | Performed by: FAMILY MEDICINE

## 2023-10-27 PROCEDURE — G0439 PPPS, SUBSEQ VISIT: HCPCS | Mod: ,,, | Performed by: FAMILY MEDICINE

## 2023-10-27 PROCEDURE — 3079F PR MOST RECENT DIASTOLIC BLOOD PRESSURE 80-89 MM HG: ICD-10-PCS | Mod: CPTII,,, | Performed by: FAMILY MEDICINE

## 2023-10-27 PROCEDURE — 92228 DIABETIC EYE SCREENING PHOTO: ICD-10-PCS | Mod: 26,,, | Performed by: OPHTHALMOLOGY

## 2023-10-27 PROCEDURE — 2024F DIABETIC EYE SCREENING PHOTO: ICD-10-PCS | Mod: CPTII,,, | Performed by: OPHTHALMOLOGY

## 2023-10-27 PROCEDURE — 1159F PR MEDICATION LIST DOCUMENTED IN MEDICAL RECORD: ICD-10-PCS | Mod: CPTII,,, | Performed by: FAMILY MEDICINE

## 2023-10-27 PROCEDURE — 3075F PR MOST RECENT SYSTOLIC BLOOD PRESS GE 130-139MM HG: ICD-10-PCS | Mod: CPTII,,, | Performed by: FAMILY MEDICINE

## 2023-10-27 PROCEDURE — 1126F PR PAIN SEVERITY QUANTIFIED, NO PAIN PRESENT: ICD-10-PCS | Mod: CPTII,,, | Performed by: FAMILY MEDICINE

## 2023-10-27 PROCEDURE — 3288F FALL RISK ASSESSMENT DOCD: CPT | Mod: CPTII,,, | Performed by: FAMILY MEDICINE

## 2023-10-27 PROCEDURE — 1160F RVW MEDS BY RX/DR IN RCRD: CPT | Mod: CPTII,,, | Performed by: FAMILY MEDICINE

## 2023-10-27 PROCEDURE — 3075F SYST BP GE 130 - 139MM HG: CPT | Mod: CPTII,,, | Performed by: FAMILY MEDICINE

## 2023-10-27 PROCEDURE — 92228 IMG RTA DETC/MNTR DS PHY/QHP: CPT | Mod: TC,,, | Performed by: FAMILY MEDICINE

## 2023-10-27 PROCEDURE — 1160F PR REVIEW ALL MEDS BY PRESCRIBER/CLIN PHARMACIST DOCUMENTED: ICD-10-PCS | Mod: CPTII,,, | Performed by: FAMILY MEDICINE

## 2023-10-27 PROCEDURE — 92228 PR REMOTE IMAGE RETINA, MONITOR/MANAGE ACTIVE DISEASE: ICD-10-PCS | Mod: TC,,, | Performed by: FAMILY MEDICINE

## 2023-10-27 PROCEDURE — 1101F PR PT FALLS ASSESS DOC 0-1 FALLS W/OUT INJ PAST YR: ICD-10-PCS | Mod: CPTII,,, | Performed by: FAMILY MEDICINE

## 2023-10-27 PROCEDURE — G0439 PR MEDICARE ANNUAL WELLNESS SUBSEQUENT VISIT: ICD-10-PCS | Mod: ,,, | Performed by: FAMILY MEDICINE

## 2023-10-27 PROCEDURE — 1126F AMNT PAIN NOTED NONE PRSNT: CPT | Mod: CPTII,,, | Performed by: FAMILY MEDICINE

## 2023-10-27 RX ORDER — GLUCAGON INJECTION, SOLUTION 1 MG/.2ML
1 INJECTION, SOLUTION SUBCUTANEOUS DAILY PRN
Qty: 2 EACH | Refills: 11 | Status: SHIPPED | OUTPATIENT
Start: 2023-10-27

## 2023-10-27 NOTE — PROGRESS NOTES
Kelly Dover is a 68 y.o. female here for a diabetic eye screening with non-dilated fundus photos per Dr. Das.    Patient cooperative?: Yes  Small pupils?: No  Last eye exam: N/A    For exam results, see Encounter Report.

## 2023-10-27 NOTE — ASSESSMENT & PLAN NOTE
Lab Results   Component Value Date    HGBA1C 7.0 07/05/2021     Urine micro ordered  Foot exam  today  Eye exam in office today    On novolin. Refill sent in. Also send in hypopen if needed for hypoglycemia episodes.

## 2023-10-27 NOTE — ASSESSMENT & PLAN NOTE
Fasting labs ordered. Will call with results when available  Declines mmg, dexa  Declines immunizations    Advanced care planning discussed and paperwork given.

## 2023-10-30 RX ORDER — ISOPROPYL ALCOHOL 70 ML/100ML
1 SWAB TOPICAL
COMMUNITY
End: 2023-10-30 | Stop reason: SDUPTHER

## 2023-10-30 NOTE — TELEPHONE ENCOUNTER
----- Message from Latha Senegal sent at 10/30/2023  2:27 PM CDT -----  .Type:  RX Refill Request    Who Called:  pt    Refill or New Rx: refill    RX Name and Strength: insulin regular (RELION NOVOLIN N) 100 unit/mL injection      How is the patient currently taking it? (ex. 1XDay): one day  '  Is this a 30 day or 90 day RX: 30    Preferred Pharmacy with phone number: [facility] in [place]    Local or Mail Order: local    Ordering Provider: Pippa    Would the patient rather a call back? Yes    Best Call Back Number: [phone]    Additional Information:  pt says she needs the (RELION NOVOLIN N )NOT THE R

## 2023-10-30 NOTE — PROGRESS NOTES
Diabetic Eye Results: Please inform patient of ABNORMAL Diabetic Eye Screen, indicating mild damage to the retina from diabetes. Will need referral to Ophthalmology for further evaluation. Does the patient have a preferred MD?

## 2023-10-31 RX ORDER — ISOPROPYL ALCOHOL 70 ML/100ML
1 SWAB TOPICAL DAILY
Qty: 200 EACH | Refills: 3 | Status: SHIPPED | OUTPATIENT
Start: 2023-10-31

## 2023-10-31 RX ORDER — INSULIN PUMP SYRINGE, 3 ML
EACH MISCELLANEOUS
Qty: 1 EACH | Refills: 0 | Status: SHIPPED | OUTPATIENT
Start: 2023-10-31 | End: 2024-10-29

## 2023-10-31 RX ORDER — DEXTROSE 4 G
TABLET,CHEWABLE ORAL
Qty: 1 EACH | Refills: 0 | Status: SHIPPED | OUTPATIENT
Start: 2023-10-31

## 2023-10-31 RX ORDER — ISOPROPYL ALCOHOL 70 ML/100ML
1 SWAB TOPICAL DAILY
Qty: 300 EACH | Refills: 11 | Status: SHIPPED | OUTPATIENT
Start: 2023-10-31

## 2023-10-31 RX ORDER — BLOOD-GLUCOSE METER
EACH MISCELLANEOUS
Qty: 1 EACH | Refills: 11 | Status: SHIPPED | OUTPATIENT
Start: 2023-10-31

## 2023-10-31 RX ORDER — LANCETS 33 GAUGE
EACH MISCELLANEOUS
Qty: 300 EACH | Refills: 11 | Status: SHIPPED | OUTPATIENT
Start: 2023-10-31

## 2023-11-18 LAB
ALBUMIN SERPL-MCNC: 4.6 G/DL (ref 3.9–4.9)
ALBUMIN/CREAT UR: <18 MG/G CREAT (ref 0–29)
ALBUMIN/GLOB SERPL: 1.6 {RATIO} (ref 1.2–2.2)
ALP SERPL-CCNC: 67 IU/L (ref 44–121)
ALT SERPL-CCNC: 11 IU/L (ref 0–32)
APPEARANCE UR: CLEAR
AST SERPL-CCNC: 23 IU/L (ref 0–40)
BACTERIA #/AREA URNS HPF: NORMAL /[HPF]
BASOPHILS # BLD AUTO: 0 X10E3/UL (ref 0–0.2)
BASOPHILS NFR BLD AUTO: 1 %
BILIRUB SERPL-MCNC: 0.2 MG/DL (ref 0–1.2)
BILIRUB UR QL STRIP: NEGATIVE
BUN SERPL-MCNC: 11 MG/DL (ref 8–27)
BUN/CREAT SERPL: 17 (ref 12–28)
CALCIUM SERPL-MCNC: 9.8 MG/DL (ref 8.7–10.3)
CHLORIDE SERPL-SCNC: 102 MMOL/L (ref 96–106)
CHOLEST SERPL-MCNC: 488 MG/DL (ref 100–199)
CO2 SERPL-SCNC: 23 MMOL/L (ref 20–29)
COLOR UR: YELLOW
CREAT SERPL-MCNC: 0.63 MG/DL (ref 0.57–1)
CREAT UR-MCNC: 68 MG/DL
CRYSTALS URNS MICRO: NORMAL
EOSINOPHIL # BLD AUTO: 0.1 X10E3/UL (ref 0–0.4)
EOSINOPHIL NFR BLD AUTO: 1 %
EPI CELLS #/AREA URNS HPF: NORMAL /HPF (ref 0–10)
ERYTHROCYTE [DISTWIDTH] IN BLOOD BY AUTOMATED COUNT: 15.1 % (ref 11.7–15.4)
EST. GFR  (NO RACE VARIABLE): 96 ML/MIN/1.73
GLOBULIN SER CALC-MCNC: 2.8 G/DL (ref 1.5–4.5)
GLUCOSE SERPL-MCNC: 108 MG/DL (ref 70–99)
GLUCOSE UR QL STRIP: NEGATIVE
HBA1C MFR BLD: 7 % (ref 4.8–5.6)
HCT VFR BLD AUTO: 39.4 % (ref 34–46.6)
HCV IGG SERPL QL IA: NON REACTIVE
HDLC SERPL-MCNC: 92 MG/DL
HGB BLD-MCNC: 13.2 G/DL (ref 11.1–15.9)
HGB UR QL STRIP: NEGATIVE
IMM GRANULOCYTES NFR BLD AUTO: 0 %
KETONES UR QL STRIP: ABNORMAL
LABORATORY COMMENT REPORT: ABNORMAL
LDLC SERPL CALC-MCNC: 370 MG/DL (ref 0–99)
LEUKOCYTE ESTERASE UR QL STRIP: ABNORMAL
LYMPHOCYTES # BLD AUTO: 2.5 X10E3/UL (ref 0.7–3.1)
LYMPHOCYTES NFR BLD AUTO: 39 %
MCH RBC QN AUTO: 32 PG (ref 26.6–33)
MCHC RBC AUTO-ENTMCNC: 33.5 G/DL (ref 31.5–35.7)
MCV RBC AUTO: 95 FL (ref 79–97)
MICRO URNS: ABNORMAL
MICROALBUMIN UR-MCNC: <12 UG/ML
MONOCYTES # BLD AUTO: 0.5 X10E3/UL (ref 0.1–0.9)
MONOCYTES NFR BLD AUTO: 8 %
NEUTROPHILS # BLD AUTO: 3.3 X10E3/UL (ref 1.4–7)
NEUTROPHILS NFR BLD AUTO: 51 %
NITRITE UR QL STRIP: NEGATIVE
PH UR STRIP: 6 [PH] (ref 5–7.5)
PLATELET # BLD AUTO: 254 X10E3/UL (ref 150–450)
POTASSIUM SERPL-SCNC: 4.3 MMOL/L (ref 3.5–5.2)
PROT SERPL-MCNC: 7.4 G/DL (ref 6–8.5)
PROT UR QL STRIP: NEGATIVE
RBC # BLD AUTO: 4.13 X10E6/UL (ref 3.77–5.28)
RBC #/AREA URNS HPF: NORMAL /HPF (ref 0–2)
SODIUM SERPL-SCNC: 140 MMOL/L (ref 134–144)
SP GR UR STRIP: 1.01 (ref 1–1.03)
SPECIMEN STATUS REPORT: NORMAL
TRIGL SERPL-MCNC: 140 MG/DL (ref 0–149)
TSH SERPL DL<=0.005 MIU/L-ACNC: 3.73 UIU/ML (ref 0.45–4.5)
UROBILINOGEN UR STRIP-MCNC: 0.2 MG/DL (ref 0.2–1)
VLDLC SERPL CALC-MCNC: 26 MG/DL (ref 5–40)
WBC # BLD AUTO: 6.4 X10E3/UL (ref 3.4–10.8)
WBC #/AREA URNS HPF: NORMAL /HPF (ref 0–5)

## 2023-11-20 NOTE — PROGRESS NOTES
Please inform patient of results.    1. Ua shows ketones. Encourage fluids  2. TC and LDL are elevated and not at goal. Pt declined rx meds at visit. Encourage supplements, diet/exercise.   3. A1c was 7.0- good job. Continue on current meds.     Other labwork within acceptable ranges.

## 2024-01-29 PROBLEM — Z00.00 ENCOUNTER FOR ANNUAL WELLNESS EXAM IN MEDICARE PATIENT: Status: RESOLVED | Noted: 2023-10-26 | Resolved: 2024-01-29

## 2024-03-13 ENCOUNTER — PATIENT OUTREACH (OUTPATIENT)
Dept: ADMINISTRATIVE | Facility: HOSPITAL | Age: 69
End: 2024-03-13
Payer: MEDICARE

## 2024-03-13 NOTE — PROGRESS NOTES
Population Health Chart Review & Patient Outreach Details      Additional [facility] Health Notes:    Patient refused mmg           Updates Requested / Reviewed:      Care Everywhere         Health Maintenance Topics Overdue:      VBHM Score: 1     Osteoporosis Screening    RSV Vaccine              Health Maintenance Topic(s) Outreach Outcomes & Actions Taken:    Breast Cancer Screening - Outreach Outcomes & Actions Taken  : Pt Declined Scheduling Mammogram

## 2024-06-06 ENCOUNTER — TELEPHONE (OUTPATIENT)
Dept: FAMILY MEDICINE | Facility: CLINIC | Age: 69
End: 2024-06-06
Payer: MEDICARE

## 2024-06-06 DIAGNOSIS — Z53.20 OSTEOPOROSIS SCREENING DECLINED: ICD-10-CM

## 2024-06-06 DIAGNOSIS — E10.65 TYPE 1 DIABETES MELLITUS WITH HYPERGLYCEMIA: Primary | ICD-10-CM

## 2024-06-06 DIAGNOSIS — E10.3293 MILD NONPROLIFERATIVE DIABETIC RETINOPATHY OF BOTH EYES WITHOUT MACULAR EDEMA ASSOCIATED WITH TYPE 1 DIABETES MELLITUS: ICD-10-CM

## 2024-06-06 DIAGNOSIS — Z00.00 ENCOUNTER FOR ANNUAL WELLNESS EXAM IN MEDICARE PATIENT: ICD-10-CM

## 2024-06-06 DIAGNOSIS — Z71.89 ADVANCED CARE PLANNING/COUNSELING DISCUSSION: ICD-10-CM

## 2024-06-06 NOTE — TELEPHONE ENCOUNTER
----- Message from Jania Eduardo sent at 6/6/2024 10:25 AM CDT -----  Regarding: orders  .Who Called: Kelly Dover    What order is the patient requesting: wellness   When does the expect the orders to be performed? 6/6/24        Preferred Method of Contact: Phone Call  Patient's Preferred Phone Number on File: [phone]   Best Call Back Number, if different:  Additional Information: pt requesting orders be sent to  [address] [place] [address]

## 2024-07-18 ENCOUNTER — PATIENT OUTREACH (OUTPATIENT)
Facility: CLINIC | Age: 69
End: 2024-07-18
Payer: MEDICARE

## 2024-07-18 NOTE — PROGRESS NOTES
Health Maintenance Topic(s) Outreach Outcomes & Actions Taken:    Breast Cancer Screening - Outreach Outcomes & Actions Taken  : Pt Declined Scheduling Mammogram    Osteoporosis Screening - Outreach Outcomes & Actions Taken  : Patient Declined Scheduling Dexa or Will Call Back to Schedule    Colorectal Cancer Screening - Outreach Outcomes & Actions Taken  : Pt Declined Completing Colorectal Cancer Screening    Medication Adherence / Statins - Outreach Outcomes & Actions Taken  : Sent Provider Message to Review to Evaluate Pt for Statin, Add Exclusion Dx Codes, Document Exclusion in Problem List, Change Statin Intensity Level to Moderate or High Intensity if Applicable      Additional Notes:  Population health chart review for health maintenance.   Annual Wellness Visit Due 10/27/2024  Next PCP F/U: 8/8/2024  SDOH Incomplete    Health Maintenance Topics Overdue:  Breast Cancer Screening- declined  DEXA- declined  Colon Screening- declined    Statin Required/Prescribed: Yes/No. Rx or exclusion dx needed         Care Management, Digital Medicine, and/or Education Referrals      Next Steps - Referral Actions: Digital Medicine Outcomes and Actions Taken: Pt Declined or Not Eligible

## 2024-07-18 NOTE — Clinical Note
Statin Therapy for Prevention of CVD in Patients with Diabetes Please review pt for statin. Current dx of Diabetes Mellitus triggers requirement for statin Rx. If patient has statin intolerance, please use any of the following as/if appropriate:  ·        M79.10 Myalgia, unspecified site ·        M60.9 Myositis ·        G72.9 Myopathy ·        G72.0 Drug- induced myopathy · N18.6 ESRD · K74.60 Cirrhosis · Z31. 83 On clomiphene · M62.82 Rhabdomyolysis · Z51.5 Hospice/ Palliative Care *these are some, not all exclusion dx Exclusion must be documented annually as an active problem and dropped on a claim every year (Jan1 - Dec 31) for patient to be and remain excluded.  Next visit 8/8/2024

## 2024-07-28 LAB
ALBUMIN SERPL-MCNC: 4.4 G/DL (ref 3.9–4.9)
ALP SERPL-CCNC: 68 IU/L (ref 44–121)
ALT SERPL-CCNC: 12 IU/L (ref 0–32)
APPEARANCE UR: CLEAR
AST SERPL-CCNC: 22 IU/L (ref 0–40)
BACTERIA #/AREA URNS HPF: NORMAL /[HPF]
BACTERIA UR CULT: ABNORMAL
BACTERIA UR CULT: ABNORMAL
BASOPHILS # BLD AUTO: 0.1 X10E3/UL (ref 0–0.2)
BASOPHILS NFR BLD AUTO: 1 %
BILIRUB SERPL-MCNC: 0.4 MG/DL (ref 0–1.2)
BILIRUB UR QL STRIP: NEGATIVE
BUN SERPL-MCNC: 11 MG/DL (ref 8–27)
BUN/CREAT SERPL: 16 (ref 12–28)
CALCIUM SERPL-MCNC: 9.7 MG/DL (ref 8.7–10.3)
CHLORIDE SERPL-SCNC: 99 MMOL/L (ref 96–106)
CHOLEST SERPL-MCNC: 550 MG/DL (ref 100–199)
CO2 SERPL-SCNC: 21 MMOL/L (ref 20–29)
COLOR UR: YELLOW
CREAT SERPL-MCNC: 0.67 MG/DL (ref 0.57–1)
CRYSTALS URNS MICRO: NORMAL
EOSINOPHIL # BLD AUTO: 0.1 X10E3/UL (ref 0–0.4)
EOSINOPHIL NFR BLD AUTO: 1 %
EPI CELLS #/AREA URNS HPF: NORMAL /HPF (ref 0–10)
ERYTHROCYTE [DISTWIDTH] IN BLOOD BY AUTOMATED COUNT: 15 % (ref 11.7–15.4)
EST. GFR  (NO RACE VARIABLE): 94 ML/MIN/1.73
GLOBULIN SER CALC-MCNC: 2.9 G/DL (ref 1.5–4.5)
GLUCOSE SERPL-MCNC: 160 MG/DL (ref 70–99)
GLUCOSE UR QL STRIP: NEGATIVE
HBA1C MFR BLD: 7.1 % (ref 4.8–5.6)
HCT VFR BLD AUTO: 40.1 % (ref 34–46.6)
HDLC SERPL-MCNC: 75 MG/DL
HGB BLD-MCNC: 13.2 G/DL (ref 11.1–15.9)
HGB UR QL STRIP: NEGATIVE
IMM GRANULOCYTES NFR BLD AUTO: 0 %
KETONES UR QL STRIP: ABNORMAL
LC LDL CALC COMMENT: ABNORMAL
LDLC SERPL CALC-MCNC: 432 MG/DL (ref 0–99)
LEUKOCYTE ESTERASE UR QL STRIP: ABNORMAL
LYMPHOCYTES # BLD AUTO: 2.4 X10E3/UL (ref 0.7–3.1)
LYMPHOCYTES NFR BLD AUTO: 33 %
MCH RBC QN AUTO: 31.4 PG (ref 26.6–33)
MCHC RBC AUTO-ENTMCNC: 32.9 G/DL (ref 31.5–35.7)
MCV RBC AUTO: 96 FL (ref 79–97)
MICRO URNS: ABNORMAL
MONOCYTES # BLD AUTO: 0.7 X10E3/UL (ref 0.1–0.9)
MONOCYTES NFR BLD AUTO: 10 %
NEUTROPHILS # BLD AUTO: 3.8 X10E3/UL (ref 1.4–7)
NEUTROPHILS NFR BLD AUTO: 55 %
NITRITE UR QL STRIP: NEGATIVE
OTHER ANTIBIOTIC SUSC ISLT: ABNORMAL
PH UR STRIP: 5.5 [PH] (ref 5–7.5)
PLATELET # BLD AUTO: 217 X10E3/UL (ref 150–450)
POTASSIUM SERPL-SCNC: 4.7 MMOL/L (ref 3.5–5.2)
PROT SERPL-MCNC: 7.3 G/DL (ref 6–8.5)
PROT UR QL STRIP: NEGATIVE
RBC # BLD AUTO: 4.2 X10E6/UL (ref 3.77–5.28)
RBC #/AREA URNS HPF: NORMAL /HPF (ref 0–2)
SODIUM SERPL-SCNC: 138 MMOL/L (ref 134–144)
SP GR UR STRIP: 1.01 (ref 1–1.03)
SPECIMEN STATUS REPORT: NORMAL
TRIGL SERPL-MCNC: 188 MG/DL (ref 0–149)
TSH SERPL DL<=0.005 MIU/L-ACNC: 2.55 UIU/ML (ref 0.45–4.5)
URINALYSIS REFLEX: ABNORMAL
UROBILINOGEN UR STRIP-MCNC: 0.2 MG/DL (ref 0.2–1)
VLDLC SERPL CALC-MCNC: 43 MG/DL (ref 5–40)
WBC # BLD AUTO: 7.1 X10E3/UL (ref 3.4–10.8)
WBC #/AREA URNS HPF: NORMAL /HPF (ref 0–5)

## 2024-08-08 ENCOUNTER — OFFICE VISIT (OUTPATIENT)
Dept: FAMILY MEDICINE | Facility: CLINIC | Age: 69
End: 2024-08-08
Payer: MEDICARE

## 2024-08-08 VITALS
BODY MASS INDEX: 24.13 KG/M2 | HEIGHT: 61 IN | WEIGHT: 127.81 LBS | SYSTOLIC BLOOD PRESSURE: 134 MMHG | HEART RATE: 92 BPM | TEMPERATURE: 98 F | OXYGEN SATURATION: 97 % | RESPIRATION RATE: 19 BRPM | DIASTOLIC BLOOD PRESSURE: 68 MMHG

## 2024-08-08 DIAGNOSIS — N39.0 URINARY TRACT INFECTION WITHOUT HEMATURIA, SITE UNSPECIFIED: ICD-10-CM

## 2024-08-08 DIAGNOSIS — E78.2 MIXED HYPERLIPIDEMIA: ICD-10-CM

## 2024-08-08 DIAGNOSIS — E10.3293 MILD NONPROLIFERATIVE DIABETIC RETINOPATHY OF BOTH EYES WITHOUT MACULAR EDEMA ASSOCIATED WITH TYPE 1 DIABETES MELLITUS: ICD-10-CM

## 2024-08-08 DIAGNOSIS — E10.65 TYPE 1 DIABETES MELLITUS WITH HYPERGLYCEMIA: Primary | ICD-10-CM

## 2024-08-08 PROCEDURE — 1159F MED LIST DOCD IN RCRD: CPT | Mod: CPTII,,, | Performed by: NURSE PRACTITIONER

## 2024-08-08 PROCEDURE — 1101F PT FALLS ASSESS-DOCD LE1/YR: CPT | Mod: CPTII,,, | Performed by: NURSE PRACTITIONER

## 2024-08-08 PROCEDURE — 3008F BODY MASS INDEX DOCD: CPT | Mod: CPTII,,, | Performed by: NURSE PRACTITIONER

## 2024-08-08 PROCEDURE — 99213 OFFICE O/P EST LOW 20 MIN: CPT | Mod: ,,, | Performed by: NURSE PRACTITIONER

## 2024-08-08 PROCEDURE — 3078F DIAST BP <80 MM HG: CPT | Mod: CPTII,,, | Performed by: NURSE PRACTITIONER

## 2024-08-08 PROCEDURE — 1126F AMNT PAIN NOTED NONE PRSNT: CPT | Mod: CPTII,,, | Performed by: NURSE PRACTITIONER

## 2024-08-08 PROCEDURE — 3051F HG A1C>EQUAL 7.0%<8.0%: CPT | Mod: CPTII,,, | Performed by: NURSE PRACTITIONER

## 2024-08-08 PROCEDURE — 3075F SYST BP GE 130 - 139MM HG: CPT | Mod: CPTII,,, | Performed by: NURSE PRACTITIONER

## 2024-08-08 PROCEDURE — 3288F FALL RISK ASSESSMENT DOCD: CPT | Mod: CPTII,,, | Performed by: NURSE PRACTITIONER

## 2024-08-08 PROCEDURE — 1160F RVW MEDS BY RX/DR IN RCRD: CPT | Mod: CPTII,,, | Performed by: NURSE PRACTITIONER

## 2024-11-04 RX ORDER — ISOPROPYL ALCOHOL 70 ML/100ML
SWAB TOPICAL
Qty: 100 EACH | Refills: 3 | Status: SHIPPED | OUTPATIENT
Start: 2024-11-04

## 2024-11-04 RX ORDER — LANCETS 33 GAUGE
EACH MISCELLANEOUS
Qty: 100 EACH | Refills: 3 | Status: SHIPPED | OUTPATIENT
Start: 2024-11-04

## 2024-11-04 NOTE — TELEPHONE ENCOUNTER
Called pt again to let her know, she said she can code it as a wellness , she doesn't want to come back in and she wants to do it every year. I notified her that it wasn't coded as one

## 2024-11-04 NOTE — TELEPHONE ENCOUNTER
Lov 8/8/24 with LINSEY marks.   No appt scheduled in chart with me    Please call patient to schedule a wellness visit before end of year with labs.        I have signed for the following orders AND/OR meds.  Please call the patient and ask the patient to schedule the testing AND/OR inform about any medications that were sent.        Medications Ordered This Encounter   Medications    alcohol swabs (DROPSAFE ALCOHOL PREP PADS) PadM     Sig: USE AS DIRECTED ONE TIME DAILY     Dispense:  100 each     Refill:  3    TRUEPLUS LANCETS 33 gauge Misc     Sig: TEST BLOOD SUGAR EVERY DAY     Dispense:  100 each     Refill:  3

## 2024-11-04 NOTE — TELEPHONE ENCOUNTER
Was not coded as wellness 8/8/24. Per ismael note 8/8/24 she was to rtc in 4 months for wellness with pcp.  Please schedule as previously requested

## 2024-11-05 ENCOUNTER — TELEPHONE (OUTPATIENT)
Dept: FAMILY MEDICINE | Facility: CLINIC | Age: 69
End: 2024-11-05
Payer: MEDICARE

## 2024-11-05 NOTE — TELEPHONE ENCOUNTER
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  thanks

## 2024-11-14 ENCOUNTER — TELEPHONE (OUTPATIENT)
Dept: FAMILY MEDICINE | Facility: CLINIC | Age: 69
End: 2024-11-14
Payer: MEDICARE

## 2024-11-14 DIAGNOSIS — E10.65 TYPE 1 DIABETES MELLITUS WITH HYPERGLYCEMIA: Primary | ICD-10-CM

## 2024-11-14 NOTE — TELEPHONE ENCOUNTER
----- Message from Med Assistant Madeleine sent at 11/14/2024  9:51 AM CST -----  Regarding: appt.  Good Morning, My name Arun malave [facility]. Ms. Dover is in need of Micro/Albumin Ratio and Diabetic Eye Exam to meet  requirement. Thanks Arun [phone]

## 2025-01-08 RX ORDER — CALCIUM CITRATE/VITAMIN D3 200MG-6.25
TABLET ORAL
Qty: 100 STRIP | Refills: 3 | Status: SHIPPED | OUTPATIENT
Start: 2025-01-08

## 2025-04-29 ENCOUNTER — TELEPHONE (OUTPATIENT)
Dept: FAMILY MEDICINE | Facility: CLINIC | Age: 70
End: 2025-04-29
Payer: MEDICARE

## 2025-04-29 DIAGNOSIS — Z00.00 ENCOUNTER FOR ANNUAL WELLNESS EXAM IN MEDICARE PATIENT: ICD-10-CM

## 2025-04-29 DIAGNOSIS — Z71.89 ADVANCED CARE PLANNING/COUNSELING DISCUSSION: ICD-10-CM

## 2025-04-29 DIAGNOSIS — E78.2 MIXED HYPERLIPIDEMIA: ICD-10-CM

## 2025-04-29 DIAGNOSIS — E10.65 TYPE 1 DIABETES MELLITUS WITH HYPERGLYCEMIA: Primary | ICD-10-CM

## 2025-04-29 DIAGNOSIS — E10.3293 MILD NONPROLIFERATIVE DIABETIC RETINOPATHY OF BOTH EYES WITHOUT MACULAR EDEMA ASSOCIATED WITH TYPE 1 DIABETES MELLITUS: ICD-10-CM

## 2025-04-29 RX ORDER — DIPHENHYDRAMINE HCL 25 MG
TABLET ORAL
Qty: 1 EACH | Refills: 3 | Status: SHIPPED | OUTPATIENT
Start: 2025-04-29

## 2025-04-29 NOTE — TELEPHONE ENCOUNTER
Copied from CRM #[id number]. Topic: Appointments - Appointment Confirmation  >> Apr 29, 2025  3:17 PM Jerald wrote:  Who Called: Kelly Dover    Caller is requesting assistance/information from provider's office.    Symptoms (please be specific):    How long has patient had these symptoms:    List of preferred pharmacies on file (remove unneeded): [unfilled]  If different, enter pharmacy into here including location and phone number:      Preferred Method of Contact: Phone Call  Patient's Preferred Phone Number on File: [phone]   Best Call Back Number, if different:  Additional Information: pt wellness appt sched  08/13, pt req labs are faxed ot [facility] ( located by [facility])

## 2025-08-01 ENCOUNTER — TELEPHONE (OUTPATIENT)
Dept: FAMILY MEDICINE | Facility: CLINIC | Age: 70
End: 2025-08-01
Payer: MEDICARE

## 2025-08-01 NOTE — TELEPHONE ENCOUNTER
Copied from CRM #[id number]. Topic: General Inquiry - Patient Advice  >> Aug 1, 2025 10:02 AM Avinash Soto wrote:  Who Called: Kelly Dover    What order is the patient requesting: wellness lab orders   When does the expect the orders to be performed? N/A        Preferred Method of Contact: Phone Call  Patient's Preferred Phone Number on File: [phone]   Best Call Back Number, if different:  Additional Information: pt would like lab orders sent to [facility] at [facility]

## 2025-08-13 ENCOUNTER — DOCUMENTATION ONLY (OUTPATIENT)
Dept: FAMILY MEDICINE | Facility: CLINIC | Age: 70
End: 2025-08-13

## 2025-08-13 ENCOUNTER — RESULTS FOLLOW-UP (OUTPATIENT)
Dept: FAMILY MEDICINE | Facility: CLINIC | Age: 70
End: 2025-08-13

## 2025-08-13 ENCOUNTER — OFFICE VISIT (OUTPATIENT)
Dept: FAMILY MEDICINE | Facility: CLINIC | Age: 70
End: 2025-08-13
Payer: MEDICARE

## 2025-08-13 VITALS
TEMPERATURE: 98 F | OXYGEN SATURATION: 98 % | RESPIRATION RATE: 16 BRPM | DIASTOLIC BLOOD PRESSURE: 82 MMHG | HEIGHT: 61 IN | BODY MASS INDEX: 23.22 KG/M2 | WEIGHT: 123 LBS | HEART RATE: 65 BPM | SYSTOLIC BLOOD PRESSURE: 138 MMHG

## 2025-08-13 DIAGNOSIS — Z53.20 SCREENING MAMMOGRAPHY DECLINED: ICD-10-CM

## 2025-08-13 DIAGNOSIS — E10.65 TYPE 1 DIABETES MELLITUS WITH HYPERGLYCEMIA: ICD-10-CM

## 2025-08-13 DIAGNOSIS — Z71.89 ADVANCED CARE PLANNING/COUNSELING DISCUSSION: ICD-10-CM

## 2025-08-13 DIAGNOSIS — E10.3293 MILD NONPROLIFERATIVE DIABETIC RETINOPATHY OF BOTH EYES WITHOUT MACULAR EDEMA ASSOCIATED WITH TYPE 1 DIABETES MELLITUS: ICD-10-CM

## 2025-08-13 DIAGNOSIS — E55.9 VITAMIN D DEFICIENCY, UNSPECIFIED: ICD-10-CM

## 2025-08-13 DIAGNOSIS — Z00.00 ENCOUNTER FOR ANNUAL WELLNESS EXAM IN MEDICARE PATIENT: Primary | ICD-10-CM

## 2025-08-13 DIAGNOSIS — E78.2 MIXED HYPERLIPIDEMIA: ICD-10-CM

## 2025-08-13 DIAGNOSIS — Z53.20 OSTEOPOROSIS SCREENING DECLINED: ICD-10-CM

## 2025-08-13 PROBLEM — N39.0 URINARY TRACT INFECTION WITHOUT HEMATURIA: Status: RESOLVED | Noted: 2024-08-08 | Resolved: 2025-08-13

## 2025-08-13 LAB
ALBUMIN SERPL-MCNC: 13.9 G/DL
ALBUMIN SERPL-MCNC: 4.2 G/DL (ref 3.8–4.8)
ALBUMIN/CREAT UR: 12 MG/G CREAT (ref 0–29)
ALBUMIN/CREAT UR: 12 UG/MG
ALP SERPL-CCNC: 73 IU/L (ref 44–121)
ALT SERPL-CCNC: 11 IU/L (ref 0–32)
APPEARANCE UR: CLEAR
AST SERPL-CCNC: 21 IU/L (ref 0–40)
BACTERIA #/AREA URNS HPF: ABNORMAL /[HPF]
BACTERIA UR CULT: NORMAL
BACTERIA UR CULT: NORMAL
BASOPHILS # BLD AUTO: 0 X10E3/UL (ref 0–0.2)
BASOPHILS NFR BLD AUTO: 1 %
BILIRUB SERPL-MCNC: 0.3 MG/DL (ref 0–1.2)
BILIRUB UR QL STRIP: NEGATIVE
BUN SERPL-MCNC: 13 MG/DL (ref 8–27)
BUN/CREAT SERPL: 22 (ref 12–28)
CALCIUM SERPL-MCNC: 9.4 MG/DL (ref 8.7–10.3)
CHLORIDE SERPL-SCNC: 102 MMOL/L (ref 96–106)
CHOLEST SERPL-MCNC: 119 MG/DL
CHOLEST SERPL-MCNC: 340 MG/DL (ref 100–199)
CHOLEST SERPL-MCNC: 340 MG/DL (ref ?–200)
CO2 SERPL-SCNC: 19 MMOL/L (ref 20–29)
COLOR UR: YELLOW
CREAT SERPL-MCNC: 0.58 MG/DL (ref 0.57–1)
CREAT SERPL-MCNC: 116.3 MG/DL
CREAT UR-MCNC: 116.3 MG/DL
CRYSTALS URNS MICRO: ABNORMAL
EOSINOPHIL # BLD AUTO: 0.1 X10E3/UL (ref 0–0.4)
EOSINOPHIL NFR BLD AUTO: 1 %
EPI CELLS #/AREA URNS HPF: ABNORMAL /HPF (ref 0–10)
ERYTHROCYTE [DISTWIDTH] IN BLOOD BY AUTOMATED COUNT: 14.6 % (ref 11.7–15.4)
GFR SERPLBLD CREATININE-BSD FMLA CKD-EPI: 97 ML/MIN/1.73
GLOBULIN SER CALC-MCNC: 2.8 G/DL (ref 1.5–4.5)
GLUCOSE SERPL-MCNC: 85 MG/DL (ref 70–99)
GLUCOSE UR QL STRIP: NEGATIVE
HBA1C MFR BLD: 7.9 % (ref 4.8–5.6)
HBA1C MFR BLD: 7.9 % (ref ?–5.6)
HCT VFR BLD AUTO: 37.8 % (ref 34–46.6)
HDLC SERPL-MCNC: 79 MG/DL
HDLC SERPL-MCNC: 79 MG/DL (ref 35–70)
HGB BLD-MCNC: 12.7 G/DL (ref 11.1–15.9)
HGB UR QL STRIP: NEGATIVE
IMM GRANULOCYTES NFR BLD AUTO: 0 %
KETONES UR QL STRIP: ABNORMAL
LC LDL CALC COMMENT: ABNORMAL
LDLC SERPL CALC-MCNC: 240 MG/DL (ref 0–99)
LDLC SERPL CALC-MCNC: 240 MG/DL (ref ?–99)
LEUKOCYTE ESTERASE UR QL STRIP: ABNORMAL
LYMPHOCYTES # BLD AUTO: 2.3 X10E3/UL (ref 0.7–3.1)
LYMPHOCYTES NFR BLD AUTO: 30 %
MCH RBC QN AUTO: 32.2 PG (ref 26.6–33)
MCHC RBC AUTO-ENTMCNC: 33.6 G/DL (ref 31.5–35.7)
MCV RBC AUTO: 96 FL (ref 79–97)
MICRO URNS: ABNORMAL
MICROALBUMIN UR-MCNC: 13.9 UG/ML
MONOCYTES # BLD AUTO: 0.7 X10E3/UL (ref 0.1–0.9)
MONOCYTES NFR BLD AUTO: 9 %
NEUTROPHILS # BLD AUTO: 4.6 X10E3/UL (ref 1.4–7)
NEUTROPHILS NFR BLD AUTO: 59 %
NITRITE UR QL STRIP: NEGATIVE
PH UR STRIP: 6 [PH] (ref 5–7.5)
PLATELET # BLD AUTO: 256 X10E3/UL (ref 150–450)
POTASSIUM SERPL-SCNC: 3.8 MMOL/L (ref 3.5–5.2)
PROT SERPL-MCNC: 7 G/DL (ref 6–8.5)
PROT UR QL STRIP: NEGATIVE
RBC # BLD AUTO: 3.95 X10E6/UL (ref 3.77–5.28)
RBC #/AREA URNS HPF: ABNORMAL /HPF (ref 0–2)
SODIUM SERPL-SCNC: 140 MMOL/L (ref 134–144)
SP GR UR STRIP: 1.02 (ref 1–1.03)
SPECIMEN STATUS REPORT: NORMAL
TRIGL SERPL-MCNC: 119 MG/DL (ref 0–149)
TSH SERPL DL<=0.005 MIU/L-ACNC: 2.96 UIU/ML (ref 0.45–4.5)
UNIDENT CRYS URNS QL MICRO: PRESENT
URINALYSIS REFLEX: ABNORMAL
UROBILINOGEN UR STRIP-MCNC: 0.2 MG/DL (ref 0.2–1)
VLDLC SERPL CALC-MCNC: 21 MG/DL (ref 5–40)
VLDLC SERPL-MCNC: 21 MG/DL
WBC # BLD AUTO: 7.7 X10E3/UL (ref 3.4–10.8)
WBC #/AREA URNS HPF: ABNORMAL /HPF (ref 0–5)

## 2025-08-25 RX ORDER — LANCETS 33 GAUGE
EACH MISCELLANEOUS
Qty: 100 EACH | Refills: 3 | Status: SHIPPED | OUTPATIENT
Start: 2025-08-25

## 2025-08-25 RX ORDER — ISOPROPYL ALCOHOL 70 ML/100ML
SWAB TOPICAL
Qty: 100 EACH | Refills: 3 | Status: SHIPPED | OUTPATIENT
Start: 2025-08-25